# Patient Record
Sex: FEMALE
[De-identification: names, ages, dates, MRNs, and addresses within clinical notes are randomized per-mention and may not be internally consistent; named-entity substitution may affect disease eponyms.]

---

## 2018-04-26 ENCOUNTER — HOSPITAL (OUTPATIENT)
Dept: OTHER | Age: 53
End: 2018-04-26
Attending: OBSTETRICS & GYNECOLOGY

## 2018-05-02 ENCOUNTER — HOSPITAL (OUTPATIENT)
Dept: OTHER | Age: 53
End: 2018-05-02
Attending: INTERNAL MEDICINE

## 2018-05-03 ENCOUNTER — DIAGNOSTIC TRANS (OUTPATIENT)
Dept: OTHER | Age: 53
End: 2018-05-03

## 2018-11-06 ENCOUNTER — HOSPITAL (OUTPATIENT)
Dept: OTHER | Age: 53
End: 2018-11-06
Attending: INTERNAL MEDICINE

## 2018-11-06 LAB
ALBUMIN SERPL-MCNC: 3.9 GM/DL (ref 3.6–5.1)
ALBUMIN/GLOB SERPL: 1 {RATIO} (ref 1–2.4)
ALP SERPL-CCNC: 124 UNIT/L (ref 45–117)
ALT SERPL-CCNC: 17 UNIT/L
ANALYZER ANC (IANC): ABNORMAL
ANION GAP SERPL CALC-SCNC: 12 MMOL/L (ref 10–20)
AST SERPL-CCNC: 19 UNIT/L
BILIRUB SERPL-MCNC: 0.7 MG/DL (ref 0.2–1)
BUN SERPL-MCNC: 25 MG/DL (ref 6–20)
BUN/CREAT SERPL: 41 (ref 7–25)
CALCIUM SERPL-MCNC: 10.3 MG/DL (ref 8.4–10.2)
CHLORIDE: 93 MMOL/L (ref 98–107)
CO2 SERPL-SCNC: 30 MMOL/L (ref 21–32)
CREAT SERPL-MCNC: 0.61 MG/DL (ref 0.51–0.95)
ERYTHROCYTE [DISTWIDTH] IN BLOOD: 12.6 % (ref 11–15)
GLOBULIN SER-MCNC: 3.8 GM/DL (ref 2–4)
GLUCOSE SERPL-MCNC: 448 MG/DL (ref 65–99)
HEMATOCRIT: 46.5 % (ref 36–46.5)
HGB BLD-MCNC: 15.7 GM/DL (ref 12–15.5)
MCH RBC QN AUTO: 28.2 PG (ref 26–34)
MCHC RBC AUTO-ENTMCNC: 33.8 GM/DL (ref 32–36.5)
MCV RBC AUTO: 83.6 FL (ref 78–100)
NRBC (NRBCRE): 0 /100 WBC
PLATELET # BLD: 220 THOUSAND/MCL (ref 140–450)
POTASSIUM SERPL-SCNC: 4 MMOL/L (ref 3.4–5.1)
PROCALCITONIN SERPL IA-MCNC: <0.05 NG/ML
PROT SERPL-MCNC: 7.7 GM/DL (ref 6.4–8.2)
RBC # BLD: 5.56 MILLION/MCL (ref 4–5.2)
SODIUM SERPL-SCNC: 131 MMOL/L (ref 135–145)
WBC # BLD: 17.5 THOUSAND/MCL (ref 4.2–11)

## 2019-06-13 ENCOUNTER — HOSPITAL ENCOUNTER (OUTPATIENT)
Age: 54
Discharge: HOME | End: 2019-06-13
Attending: FAMILY MEDICINE
Payer: COMMERCIAL

## 2019-06-13 VITALS
SYSTOLIC BLOOD PRESSURE: 142 MMHG | HEIGHT: 65.5 IN | BODY MASS INDEX: 28.81 KG/M2 | WEIGHT: 175 LBS | TEMPERATURE: 100 F | HEART RATE: 96 BPM | DIASTOLIC BLOOD PRESSURE: 73 MMHG | OXYGEN SATURATION: 96 % | RESPIRATION RATE: 16 BRPM

## 2019-06-13 DIAGNOSIS — J02.9 PHARYNGITIS, UNSPECIFIED ETIOLOGY: Primary | ICD-10-CM

## 2019-06-13 PROCEDURE — 87081 CULTURE SCREEN ONLY: CPT | Performed by: FAMILY MEDICINE

## 2019-06-13 PROCEDURE — 99204 OFFICE O/P NEW MOD 45 MIN: CPT

## 2019-06-13 PROCEDURE — 87430 STREP A AG IA: CPT | Performed by: FAMILY MEDICINE

## 2019-06-13 PROCEDURE — 99214 OFFICE O/P EST MOD 30 MIN: CPT

## 2019-06-13 RX ORDER — METOPROLOL SUCCINATE 25 MG/1
50 TABLET, EXTENDED RELEASE ORAL DAILY
COMMUNITY
End: 2020-06-12

## 2019-06-13 RX ORDER — LOSARTAN POTASSIUM 25 MG/1
TABLET ORAL DAILY
COMMUNITY
End: 2019-10-03

## 2019-06-13 RX ORDER — ATORVASTATIN CALCIUM 40 MG/1
40 TABLET, FILM COATED ORAL NIGHTLY
COMMUNITY
End: 2020-07-28

## 2019-06-13 RX ORDER — AZITHROMYCIN 250 MG/1
TABLET, FILM COATED ORAL
Qty: 1 PACKAGE | Refills: 0 | Status: SHIPPED | OUTPATIENT
Start: 2019-06-13 | End: 2019-06-18

## 2019-06-13 RX ORDER — ESCITALOPRAM OXALATE 10 MG/1
10 TABLET ORAL DAILY
COMMUNITY
End: 2019-10-03

## 2019-06-13 RX ORDER — AMLODIPINE BESYLATE 5 MG/1
5 TABLET ORAL DAILY
COMMUNITY
End: 2020-02-05

## 2019-06-14 NOTE — ED INITIAL ASSESSMENT (HPI)
Sore throat that started yesterday, no fevers but now her fever is 100.4 F or cough, \"There is pus in the back of my throat. + body aches and chills. No cough.

## 2019-06-14 NOTE — ED PROVIDER NOTES
Patient Seen in: [id number] [facility]    History   Patient presents with:  Sore Throat    Stated Complaint: strep ? HPI    47-year-old female presents with sore throat and fever. States she has a sudden onset of sore throat yesterday . she are equal, round, and reactive to light. Conjunctivae and EOM are normal.   Neck: Normal range of motion. Neck supple. Cardiovascular: Normal rate, regular rhythm and normal heart sounds.    Pulmonary/Chest: Effort normal and breath sounds normal.   Good

## 2019-06-16 NOTE — ED NOTES
Informed of strep culture result. Sts symptoms are improving. Advised to f/u with PMD if symptoms return. Verb understanding.

## 2019-10-03 ENCOUNTER — OFFICE VISIT (OUTPATIENT)
Dept: FAMILY MEDICINE CLINIC | Facility: CLINIC | Age: 54
End: 2019-10-03
Payer: COMMERCIAL

## 2019-10-03 VITALS
HEIGHT: 65.5 IN | BODY MASS INDEX: 29.46 KG/M2 | HEART RATE: 76 BPM | TEMPERATURE: 99 F | OXYGEN SATURATION: 95 % | RESPIRATION RATE: 16 BRPM | DIASTOLIC BLOOD PRESSURE: 80 MMHG | WEIGHT: 179 LBS | SYSTOLIC BLOOD PRESSURE: 128 MMHG

## 2019-10-03 DIAGNOSIS — E11.9 TYPE 2 DIABETES MELLITUS WITHOUT COMPLICATION, WITHOUT LONG-TERM CURRENT USE OF INSULIN (HCC): Primary | ICD-10-CM

## 2019-10-03 PROCEDURE — 99204 OFFICE O/P NEW MOD 45 MIN: CPT | Performed by: FAMILY MEDICINE

## 2019-10-03 RX ORDER — LOSARTAN POTASSIUM 50 MG/1
TABLET ORAL
COMMUNITY
Start: 2019-09-28 | End: 2019-12-12

## 2019-10-03 RX ORDER — CHLORTHALIDONE 25 MG/1
TABLET ORAL
Refills: 1 | COMMUNITY
Start: 2019-08-21 | End: 2019-10-03

## 2019-10-03 RX ORDER — ESCITALOPRAM OXALATE 20 MG/1
20 TABLET ORAL
Refills: 3 | COMMUNITY
Start: 2019-07-27 | End: 2020-08-11

## 2019-10-03 RX ORDER — ZOLPIDEM TARTRATE 10 MG/1
TABLET ORAL
COMMUNITY
Start: 2019-09-30 | End: 2019-12-12

## 2019-10-03 NOTE — PROGRESS NOTES
Alyssa Jean is a 62year old female. Patient presents with:  Establish Care      HPI:   Patient recently moved to the area. She has a history of diabetes. She was diagnosed about a year ago. She has subsequently lost 40 to 50 pounds.   Her initial A1c wa denies chest pain   GI: denies nausea, vomiting, diarrhea or abdominal pain   NEURO: denies headaches    EXAM:   /80   Pulse 76   Temp 98.7 °F (37.1 °C) (Temporal)   Resp 16   Ht 65.5\"   Wt 179 lb (81.2 kg)   SpO2 95%   BMI 29.33 kg/m²   GENERAL: we

## 2019-10-07 DIAGNOSIS — E11.9 TYPE 2 DIABETES MELLITUS WITHOUT COMPLICATION, WITHOUT LONG-TERM CURRENT USE OF INSULIN (HCC): Primary | ICD-10-CM

## 2019-11-07 ENCOUNTER — TELEPHONE (OUTPATIENT)
Dept: FAMILY MEDICINE CLINIC | Facility: CLINIC | Age: 54
End: 2019-11-07

## 2019-11-07 DIAGNOSIS — Z80.3 FAMILY HISTORY OF BREAST CANCER IN MOTHER: ICD-10-CM

## 2019-11-07 DIAGNOSIS — Z12.39 SCREENING FOR BREAST CANCER: Primary | ICD-10-CM

## 2019-11-07 NOTE — TELEPHONE ENCOUNTER
States she has had to be called back for additional views in the past. Told of dense breasts. Also Mom had breast cancer. Has been recommended to have the 2D & 3D mammogram done. Mammogram ordered, central scheduling # provided.   Discussed need for gynecol

## 2019-11-15 ENCOUNTER — HOSPITAL ENCOUNTER (OUTPATIENT)
Dept: MAMMOGRAPHY | Age: 54
Discharge: HOME | End: 2019-11-15
Attending: FAMILY MEDICINE
Payer: COMMERCIAL

## 2019-11-15 DIAGNOSIS — Z12.39 SCREENING FOR BREAST CANCER: ICD-10-CM

## 2019-11-15 DIAGNOSIS — Z80.3 FAMILY HISTORY OF BREAST CANCER IN MOTHER: ICD-10-CM

## 2019-11-15 PROCEDURE — 77063 BREAST TOMOSYNTHESIS BI: CPT | Performed by: FAMILY MEDICINE

## 2019-11-15 PROCEDURE — 77067 SCR MAMMO BI INCL CAD: CPT | Performed by: FAMILY MEDICINE

## 2019-11-18 DIAGNOSIS — E11.9 TYPE 2 DIABETES MELLITUS WITHOUT COMPLICATION, WITHOUT LONG-TERM CURRENT USE OF INSULIN (HCC): Primary | ICD-10-CM

## 2019-11-20 ENCOUNTER — OFFICE VISIT (OUTPATIENT)
Dept: FAMILY MEDICINE CLINIC | Facility: CLINIC | Age: 54
End: 2019-11-20
Payer: COMMERCIAL

## 2019-11-20 VITALS
OXYGEN SATURATION: 99 % | WEIGHT: 181.25 LBS | BODY MASS INDEX: 30.57 KG/M2 | RESPIRATION RATE: 18 BRPM | HEIGHT: 64.74 IN | TEMPERATURE: 98 F

## 2019-11-20 DIAGNOSIS — Z01.419 ROUTINE GYNECOLOGICAL EXAMINATION: Primary | ICD-10-CM

## 2019-11-20 DIAGNOSIS — N95.2 POSTMENOPAUSAL ATROPHIC VAGINITIS: ICD-10-CM

## 2019-11-20 DIAGNOSIS — G47.33 OSA (OBSTRUCTIVE SLEEP APNEA): ICD-10-CM

## 2019-11-20 DIAGNOSIS — E11.9 TYPE 2 DIABETES MELLITUS WITHOUT COMPLICATION, WITHOUT LONG-TERM CURRENT USE OF INSULIN (HCC): ICD-10-CM

## 2019-11-20 DIAGNOSIS — E78.2 MIXED HYPERLIPIDEMIA: ICD-10-CM

## 2019-11-20 DIAGNOSIS — R42 DIZZINESS ON STANDING: ICD-10-CM

## 2019-11-20 DIAGNOSIS — I10 ESSENTIAL HYPERTENSION: ICD-10-CM

## 2019-11-20 PROBLEM — G47.09 OTHER INSOMNIA: Status: ACTIVE | Noted: 2019-11-20

## 2019-11-20 PROBLEM — F41.9 ANXIETY AND DEPRESSION: Status: ACTIVE | Noted: 2019-11-20

## 2019-11-20 PROBLEM — F32.A ANXIETY AND DEPRESSION: Status: ACTIVE | Noted: 2019-11-20

## 2019-11-20 PROCEDURE — 87624 HPV HI-RISK TYP POOLED RSLT: CPT | Performed by: NURSE PRACTITIONER

## 2019-11-20 PROCEDURE — 88175 CYTOPATH C/V AUTO FLUID REDO: CPT | Performed by: NURSE PRACTITIONER

## 2019-11-20 PROCEDURE — 99396 PREV VISIT EST AGE 40-64: CPT | Performed by: NURSE PRACTITIONER

## 2019-11-20 RX ORDER — AMLODIPINE BESYLATE 5 MG/1
TABLET ORAL
Qty: 90 TABLET | Refills: 0 | OUTPATIENT
Start: 2019-11-20

## 2019-11-20 RX ORDER — ESTRADIOL 0.1 MG/G
1 CREAM VAGINAL
Qty: 42.5 G | Refills: 0 | Status: SHIPPED | OUTPATIENT
Start: 2019-11-21 | End: 2020-11-30

## 2019-11-20 NOTE — PROGRESS NOTES
HPI:   Patient is here for physical and pap smear. She has been experiencing painful intercourse for several months. Her vagina feels very dry. She had an endometrial ablation about 15 years ago.  She did bleed lightly after the ablation but stopped bleedin Semaglutide (OZEMPIC) 0.25 or 0.5 MG/DOSE Subcutaneous Solution Pen-injector Inject into the skin once a week. • chlorthalidone 25 MG Oral Tab Take 25 mg by mouth daily.         Past Medical History:   Diagnosis Date   • Diabetes [facility])    • Essential hy Negative for joint swelling. Skin: Negative for rash. Neurological: Positive for dizziness (upon standing). Negative for weakness, numbness and headaches. Hematological: Does not bruise/bleed easily.    Psychiatric/Behavioral: Positive for sleep distu orders for this visit:    Routine gynecological examination  -     Cancel: THINPREP PAP SMEAR B; Future  -     Cancel: HPV HIGH RISK , THIN PREP COLLECTION; Future  -     HPV HIGH RISK , THIN PREP COLLECTION;  Future  -     THINPREP PAP SMEAR B; Future    P

## 2019-11-20 NOTE — TELEPHONE ENCOUNTER
LOV today, physical P Amauri LOVE    LAST LAB    LAST RX   AMLODIPINE BESYLATE 5 MG TABS 11/18/2019 11/18/2019  90 Bottle  [address] [id number]      Never given by Dr Stover Given    Next OV No future appointments.       PROTOCOL    Hypertens

## 2019-12-13 ENCOUNTER — TELEPHONE (OUTPATIENT)
Dept: FAMILY MEDICINE CLINIC | Facility: CLINIC | Age: 54
End: 2019-12-13

## 2019-12-13 RX ORDER — LOSARTAN POTASSIUM 50 MG/1
TABLET ORAL
Qty: 90 TABLET | Refills: 1 | Status: SHIPPED | OUTPATIENT
Start: 2019-12-13 | End: 2020-06-12

## 2019-12-13 RX ORDER — SEMAGLUTIDE 1.34 MG/ML
INJECTION, SOLUTION SUBCUTANEOUS
Qty: 4.5 PEN | Refills: 1 | Status: SHIPPED | OUTPATIENT
Start: 2019-12-13 | End: 2020-10-14

## 2019-12-13 RX ORDER — ZOLPIDEM TARTRATE 10 MG/1
TABLET ORAL
Qty: 30 TABLET | Refills: 1 | Status: SHIPPED | OUTPATIENT
Start: 2019-12-13 | End: 2020-03-13

## 2019-12-13 NOTE — TELEPHONE ENCOUNTER
PHARMACY CALLED, LOSARTAN 50MG IS ON BACK ORDER.      THEY CAN CHANGE TO LOSARTAN 100MG TO CUT IN HALF, OR CAN CHANGE TO LOSARTAN 25MG TO TAKE 2 TABS TO EQUAL 50MG      PLEASE ADVISE

## 2019-12-14 RX ORDER — LOSARTAN POTASSIUM 100 MG/1
50 TABLET ORAL DAILY
Qty: 45 TABLET | Refills: 0 | Status: SHIPPED | OUTPATIENT
Start: 2019-12-14 | End: 2020-03-13

## 2019-12-31 RX ORDER — METOPROLOL SUCCINATE 50 MG/1
TABLET, EXTENDED RELEASE ORAL
Qty: 90 TABLET | Refills: 0 | Status: SHIPPED | OUTPATIENT
Start: 2019-12-31 | End: 2020-03-23

## 2020-02-05 RX ORDER — AMLODIPINE BESYLATE 5 MG/1
TABLET ORAL
Qty: 90 TABLET | Refills: 0 | Status: SHIPPED | OUTPATIENT
Start: 2020-02-05 | End: 2020-05-11

## 2020-02-05 NOTE — TELEPHONE ENCOUNTER
Last office visit: 10/03/19  Last refill: Externally Reported  Labs Due: 4/2020  No future appointments.   Protocol: Passed    Name from pharmacy: AMLODIPINE BESYLATE 5 MG TAB          Will file in chart as: AMLODIPINE BESYLATE 5 MG Oral Tab         Sig: TA

## 2020-03-13 RX ORDER — ZOLPIDEM TARTRATE 10 MG/1
TABLET ORAL
Qty: 30 TABLET | Refills: 1 | Status: SHIPPED | OUTPATIENT
Start: 2020-03-13 | End: 2020-06-15

## 2020-03-13 RX ORDER — LOSARTAN POTASSIUM 100 MG/1
50 TABLET ORAL DAILY
Qty: 45 TABLET | Refills: 0 | Status: SHIPPED | OUTPATIENT
Start: 2020-03-13 | End: 2020-06-12 | Stop reason: ALTCHOICE

## 2020-03-13 NOTE — TELEPHONE ENCOUNTER
Last refill on zolpidem #30 x 1 on 12/13/19  Last refill on losartan #45 on 12/14/19  Last office visit on 11/20/19  No future appointments.   Last cmp on 10/4/19

## 2020-03-23 RX ORDER — METOPROLOL SUCCINATE 50 MG/1
TABLET, EXTENDED RELEASE ORAL
Qty: 90 TABLET | Refills: 0 | Status: SHIPPED | OUTPATIENT
Start: 2020-03-23 | End: 2020-06-15

## 2020-05-11 RX ORDER — CHLORTHALIDONE 25 MG/1
TABLET ORAL
Qty: 90 TABLET | Refills: 0 | Status: SHIPPED | OUTPATIENT
Start: 2020-05-11 | End: 2020-07-28

## 2020-05-11 RX ORDER — AMLODIPINE BESYLATE 5 MG/1
TABLET ORAL
Qty: 90 TABLET | Refills: 0 | Status: SHIPPED | OUTPATIENT
Start: 2020-05-11 | End: 2020-07-28

## 2020-05-11 NOTE — TELEPHONE ENCOUNTER
Hypertension Medications Protocol Passed5/10 12:16 AM   CMP or BMP in past 12 months    Last serum creatinine< 2.0    Appointment in past 6 or next 3 months     BP Readings from Last 3 Encounters:  10/03/19 : 128/80  06/13/19 : 142/73    No future appointm

## 2020-05-11 NOTE — TELEPHONE ENCOUNTER
Please schedule a follow-up appointment in 1 month. We will recheck blood pressure and blood work at that time.

## 2020-06-10 RX ORDER — ZOLPIDEM TARTRATE 10 MG/1
TABLET ORAL
Qty: 30 TABLET | Refills: 1 | OUTPATIENT
Start: 2020-06-10

## 2020-06-12 ENCOUNTER — OFFICE VISIT (OUTPATIENT)
Dept: FAMILY MEDICINE CLINIC | Facility: CLINIC | Age: 55
End: 2020-06-12
Payer: COMMERCIAL

## 2020-06-12 ENCOUNTER — APPOINTMENT (OUTPATIENT)
Dept: LAB | Age: 55
End: 2020-06-12
Attending: FAMILY MEDICINE
Payer: COMMERCIAL

## 2020-06-12 VITALS
WEIGHT: 181.38 LBS | DIASTOLIC BLOOD PRESSURE: 80 MMHG | BODY MASS INDEX: 30.59 KG/M2 | SYSTOLIC BLOOD PRESSURE: 124 MMHG | OXYGEN SATURATION: 97 % | HEART RATE: 78 BPM | HEIGHT: 64.75 IN | TEMPERATURE: 97 F

## 2020-06-12 DIAGNOSIS — G47.33 OSA (OBSTRUCTIVE SLEEP APNEA): ICD-10-CM

## 2020-06-12 DIAGNOSIS — E78.2 MIXED HYPERLIPIDEMIA: ICD-10-CM

## 2020-06-12 DIAGNOSIS — E11.9 TYPE 2 DIABETES MELLITUS WITHOUT COMPLICATION, WITHOUT LONG-TERM CURRENT USE OF INSULIN (HCC): Primary | ICD-10-CM

## 2020-06-12 DIAGNOSIS — I10 ESSENTIAL HYPERTENSION: ICD-10-CM

## 2020-06-12 PROCEDURE — 99214 OFFICE O/P EST MOD 30 MIN: CPT | Performed by: FAMILY MEDICINE

## 2020-06-12 PROCEDURE — 90471 IMMUNIZATION ADMIN: CPT | Performed by: FAMILY MEDICINE

## 2020-06-12 PROCEDURE — 90670 PCV13 VACCINE IM: CPT | Performed by: FAMILY MEDICINE

## 2020-06-12 RX ORDER — LOSARTAN POTASSIUM 50 MG/1
TABLET ORAL DAILY
COMMUNITY
End: 2020-10-05

## 2020-06-12 NOTE — PROGRESS NOTES
Tejinder Espinoza is a 62year old female. Patient presents with:  Diabetes: fup on meds, due for DM labs, eye exam, FOOT exam....room 1  HTN: fup on meds, BP check      HPI:   Patient returns to recheck her blood pressure.   She is also due for diabeti , Disp: , Rfl:   [DISCONTINUED] metFORMIN HCl 500 MG Oral Tab, Take 500 mg by mouth 2 (two) times daily with meals. , Disp: , Rfl:   [DISCONTINUED] chlorthalidone 25 MG Oral Tab, Take 25 mg by mouth daily. , Disp: , Rfl:     No current facility-administered reflexes present            Monofilament testing indicates normal sensation            ASSESSMENT AND PLAN:     Type 2 diabetes mellitus without complication, without long-term current use of insulin (hcc)  (primary encounter diagnosis)  Essential hyperten

## 2020-06-13 NOTE — TELEPHONE ENCOUNTER
LOV:  6/12/2020     LAB:    6/12/2020     LRX:    METOPROLOL SUCCINATE ER 50 MG Oral Tablet 24 Hr 90 tablet 0 3/23/2020  Zolpidem Tartrate 10 MG Oral Tab 30 tablet 1 refill 3/13/2020    NOV:   No future appointments.     PROTOCOL:

## 2020-06-15 DIAGNOSIS — E11.9 TYPE 2 DIABETES MELLITUS WITHOUT COMPLICATION, WITHOUT LONG-TERM CURRENT USE OF INSULIN (HCC): Primary | ICD-10-CM

## 2020-06-15 DIAGNOSIS — I10 ESSENTIAL HYPERTENSION: ICD-10-CM

## 2020-06-15 DIAGNOSIS — E78.2 MIXED HYPERLIPIDEMIA: ICD-10-CM

## 2020-06-15 RX ORDER — ZOLPIDEM TARTRATE 10 MG/1
TABLET ORAL
Qty: 30 TABLET | Refills: 1 | Status: SHIPPED | OUTPATIENT
Start: 2020-06-15 | End: 2020-09-23

## 2020-06-15 RX ORDER — METOPROLOL SUCCINATE 50 MG/1
TABLET, EXTENDED RELEASE ORAL
Qty: 90 TABLET | Refills: 0 | Status: SHIPPED | OUTPATIENT
Start: 2020-06-15 | End: 2020-07-28

## 2020-07-28 RX ORDER — ATORVASTATIN CALCIUM 40 MG/1
40 TABLET, FILM COATED ORAL NIGHTLY
Qty: 90 TABLET | Refills: 0 | Status: SHIPPED | OUTPATIENT
Start: 2020-07-28 | End: 2020-10-21

## 2020-07-28 RX ORDER — METOPROLOL SUCCINATE 50 MG/1
TABLET, EXTENDED RELEASE ORAL
Qty: 90 TABLET | Refills: 1 | Status: SHIPPED | OUTPATIENT
Start: 2020-07-28 | End: 2021-01-28

## 2020-07-28 RX ORDER — CHLORTHALIDONE 25 MG/1
TABLET ORAL
Qty: 90 TABLET | Refills: 1 | Status: SHIPPED | OUTPATIENT
Start: 2020-07-28 | End: 2021-01-28

## 2020-07-28 RX ORDER — AMLODIPINE BESYLATE 5 MG/1
TABLET ORAL
Qty: 90 TABLET | Refills: 1 | Status: SHIPPED | OUTPATIENT
Start: 2020-07-28 | End: 2021-01-28

## 2020-07-28 NOTE — TELEPHONE ENCOUNTER
LOV: 06/12/20    LAST LAB: 06/12/20    LAST RX: 06/15/20    Next OV: No future appointments.     PROTOCOL    Hypertension Medications Protocol Passed7/28 10:49 AM   CMP or BMP in past 12 months    Last serum creatinine< 2.0    Appointment in past 6 or next

## 2020-07-28 NOTE — TELEPHONE ENCOUNTER
LOV: 06/12/20    LAST LAB: 06/12/20    LAST RX: ?    Next OV: No future appointments.     PROTOCOL    Cholesterol Medication Protocol Passed7/28 2:51 PM   ALT < 80    ALT resulted within past year    Lipid panel within past 12 months    Appointment within p

## 2020-08-11 RX ORDER — ESCITALOPRAM OXALATE 20 MG/1
20 TABLET ORAL EVERY MORNING
Qty: 90 TABLET | Refills: 0 | Status: SHIPPED | OUTPATIENT
Start: 2020-08-11 | End: 2020-11-09

## 2020-09-23 RX ORDER — ZOLPIDEM TARTRATE 10 MG/1
TABLET ORAL
Qty: 30 TABLET | Refills: 1 | Status: SHIPPED | OUTPATIENT
Start: 2020-09-23 | End: 2020-11-30

## 2020-09-23 NOTE — TELEPHONE ENCOUNTER
Last OV: 6/12/20  Last refill: 6/15/20 #30 Tablets w/ 1 refill  Requested Prescriptions     Pending Prescriptions Disp Refills   • ZOLPIDEM TARTRATE 10 MG Oral Tab [Pharmacy Med Name: ZOLPIDEM TARTRATE 10 MG TABLET] 30 tablet 1     Sig: TAKE 1 TABLET BY MO

## 2020-10-05 RX ORDER — LOSARTAN POTASSIUM 50 MG/1
50 TABLET ORAL DAILY
Qty: 90 TABLET | Refills: 0 | Status: SHIPPED | OUTPATIENT
Start: 2020-10-05 | End: 2021-01-04

## 2020-10-05 NOTE — TELEPHONE ENCOUNTER
LOV: 06/12/20    LAST LAB: 06/12/20    LAST RX: Metformin 03/30/20, Losartan 03/13/20 - Confirmed with pt that she is still taking losartan 50mg daily. Next OV: No future appointments.     PROTOCOL    Diabetic Medication Protocol Tnjryx56/05/2020 11:25

## 2020-10-14 RX ORDER — SEMAGLUTIDE 1.34 MG/ML
0.5 INJECTION, SOLUTION SUBCUTANEOUS WEEKLY
Qty: 4.5 PEN | Refills: 0 | Status: SHIPPED | OUTPATIENT
Start: 2020-10-14 | End: 2021-01-08

## 2020-10-14 NOTE — TELEPHONE ENCOUNTER
LOV: 6/12/20    LAST LAB: 6/12/20    LAST RX: 12/13/19    Next OV: No future appointments.     PROTOCOL    Diabetic Medication Protocol Xsyflb30/14/2020 01:45 PM   HgBA1C procedure resulted in past 6 months Protocol Details    Last HgBA1C < 7.5     Microalb

## 2020-10-16 ENCOUNTER — TELEPHONE (OUTPATIENT)
Dept: FAMILY MEDICINE CLINIC | Facility: CLINIC | Age: 55
End: 2020-10-16

## 2020-10-16 NOTE — TELEPHONE ENCOUNTER
Destinee Umana [facility] is saying that her Semaglutide,0.25 or 0.5MG/DOS, (OZEMPIC, 0.25 OR 0.5 MG/DOSE,) 2 MG/1.5ML Subcutaneous Solution Pen-injector is being rejected because it is only for 1 month if we could please change this to a 3 month script.   Destinee Umana

## 2020-10-16 NOTE — TELEPHONE ENCOUNTER
[facility] to inquire about Ozem[facility] (see pt's msg below). [facility] states that all has been taken care of, they do not need any additional info from provider and refill will be processed. Left detailed msg for pt. Advised to call pharmacy.

## 2020-10-21 ENCOUNTER — TELEPHONE (OUTPATIENT)
Dept: FAMILY MEDICINE CLINIC | Facility: CLINIC | Age: 55
End: 2020-10-21

## 2020-10-21 DIAGNOSIS — Z12.31 BREAST CANCER SCREENING BY MAMMOGRAM: Primary | ICD-10-CM

## 2020-10-21 RX ORDER — ATORVASTATIN CALCIUM 40 MG/1
TABLET, FILM COATED ORAL
Qty: 90 TABLET | Refills: 0 | Status: SHIPPED | OUTPATIENT
Start: 2020-10-21 | End: 2021-01-13

## 2020-10-21 NOTE — TELEPHONE ENCOUNTER
Last office visit 6/12/2020      Last last refill 7/28/20 Atorvastatin 40 mg #90 x 0 refills       Last lab 6/12/2020                     Cholesterol Medication Protocol Obljca30/21/2020 03:33 AM   ALT < 80 Protocol Details    ALT resulted within past

## 2020-10-21 NOTE — TELEPHONE ENCOUNTER
Last mammogram screening was 11/7/19, does she need to wait full year for insurance to cover it? Order has been placed.

## 2020-11-09 RX ORDER — ESCITALOPRAM OXALATE 20 MG/1
TABLET ORAL
Qty: 90 TABLET | Refills: 0 | Status: SHIPPED | OUTPATIENT
Start: 2020-11-09 | End: 2021-02-08

## 2020-11-09 NOTE — TELEPHONE ENCOUNTER
LOV:6-  LAST LAB : 6-    LAST RX:     Medication Quantity Refills Start End   escitalopram 20 MG Oral Tab 90 tablet 0 8/11/2020    Sig:   Take 1 tablet (20 mg total) by mouth every morning.            Next OV:  Future Appointments   Date Time

## 2020-11-16 ENCOUNTER — HOSPITAL ENCOUNTER (OUTPATIENT)
Dept: MAMMOGRAPHY | Age: 55
Discharge: HOME | End: 2020-11-16
Attending: NURSE PRACTITIONER
Payer: COMMERCIAL

## 2020-11-16 DIAGNOSIS — Z12.31 BREAST CANCER SCREENING BY MAMMOGRAM: ICD-10-CM

## 2020-11-16 PROCEDURE — 77067 SCR MAMMO BI INCL CAD: CPT | Performed by: NURSE PRACTITIONER

## 2020-11-19 ENCOUNTER — HOSPITAL ENCOUNTER (OUTPATIENT)
Dept: ULTRASOUND IMAGING | Age: 55
Discharge: HOME | End: 2020-11-19
Attending: NURSE PRACTITIONER
Payer: COMMERCIAL

## 2020-11-19 ENCOUNTER — HOSPITAL ENCOUNTER (OUTPATIENT)
Dept: MAMMOGRAPHY | Age: 55
Discharge: HOME | End: 2020-11-19
Attending: NURSE PRACTITIONER
Payer: COMMERCIAL

## 2020-11-19 DIAGNOSIS — R92.2 INCONCLUSIVE MAMMOGRAM: ICD-10-CM

## 2020-11-19 PROCEDURE — 77065 DX MAMMO INCL CAD UNI: CPT | Performed by: NURSE PRACTITIONER

## 2020-11-19 PROCEDURE — 77061 BREAST TOMOSYNTHESIS UNI: CPT | Performed by: NURSE PRACTITIONER

## 2020-11-19 PROCEDURE — 76642 ULTRASOUND BREAST LIMITED: CPT | Performed by: NURSE PRACTITIONER

## 2020-11-30 ENCOUNTER — LAB ENCOUNTER (OUTPATIENT)
Dept: LAB | Age: 55
End: 2020-11-30
Attending: FAMILY MEDICINE
Payer: COMMERCIAL

## 2020-11-30 ENCOUNTER — OFFICE VISIT (OUTPATIENT)
Dept: FAMILY MEDICINE CLINIC | Facility: CLINIC | Age: 55
End: 2020-11-30
Payer: COMMERCIAL

## 2020-11-30 VITALS
BODY MASS INDEX: 30.4 KG/M2 | TEMPERATURE: 98 F | HEIGHT: 64.75 IN | DIASTOLIC BLOOD PRESSURE: 78 MMHG | HEART RATE: 78 BPM | WEIGHT: 180.25 LBS | RESPIRATION RATE: 18 BRPM | SYSTOLIC BLOOD PRESSURE: 120 MMHG

## 2020-11-30 DIAGNOSIS — E78.2 MIXED HYPERLIPIDEMIA: ICD-10-CM

## 2020-11-30 DIAGNOSIS — I10 ESSENTIAL HYPERTENSION: ICD-10-CM

## 2020-11-30 DIAGNOSIS — R00.2 PALPITATIONS: ICD-10-CM

## 2020-11-30 DIAGNOSIS — Z00.00 PREVENTATIVE HEALTH CARE: Primary | ICD-10-CM

## 2020-11-30 DIAGNOSIS — N95.2 POSTMENOPAUSAL ATROPHIC VAGINITIS: ICD-10-CM

## 2020-11-30 DIAGNOSIS — N95.0 POSTMENOPAUSAL BLEEDING: ICD-10-CM

## 2020-11-30 DIAGNOSIS — G47.33 OSA (OBSTRUCTIVE SLEEP APNEA): ICD-10-CM

## 2020-11-30 DIAGNOSIS — E11.9 TYPE 2 DIABETES MELLITUS WITHOUT COMPLICATION, WITHOUT LONG-TERM CURRENT USE OF INSULIN (HCC): ICD-10-CM

## 2020-11-30 DIAGNOSIS — F32.A ANXIETY AND DEPRESSION: ICD-10-CM

## 2020-11-30 DIAGNOSIS — F41.9 ANXIETY AND DEPRESSION: ICD-10-CM

## 2020-11-30 PROCEDURE — 3008F BODY MASS INDEX DOCD: CPT | Performed by: NURSE PRACTITIONER

## 2020-11-30 PROCEDURE — 3074F SYST BP LT 130 MM HG: CPT | Performed by: NURSE PRACTITIONER

## 2020-11-30 PROCEDURE — 83036 HEMOGLOBIN GLYCOSYLATED A1C: CPT | Performed by: FAMILY MEDICINE

## 2020-11-30 PROCEDURE — 80061 LIPID PANEL: CPT | Performed by: FAMILY MEDICINE

## 2020-11-30 PROCEDURE — 99396 PREV VISIT EST AGE 40-64: CPT | Performed by: NURSE PRACTITIONER

## 2020-11-30 PROCEDURE — 80053 COMPREHEN METABOLIC PANEL: CPT | Performed by: FAMILY MEDICINE

## 2020-11-30 PROCEDURE — 36415 COLL VENOUS BLD VENIPUNCTURE: CPT | Performed by: FAMILY MEDICINE

## 2020-11-30 PROCEDURE — 93000 ELECTROCARDIOGRAM COMPLETE: CPT | Performed by: NURSE PRACTITIONER

## 2020-11-30 PROCEDURE — 99072 ADDL SUPL MATRL&STAF TM PHE: CPT | Performed by: NURSE PRACTITIONER

## 2020-11-30 PROCEDURE — 3078F DIAST BP <80 MM HG: CPT | Performed by: NURSE PRACTITIONER

## 2020-11-30 RX ORDER — CONJUGATED ESTROGENS 0.62 MG/G
0.5 CREAM VAGINAL
Qty: 30 G | Refills: 0 | Status: SHIPPED | OUTPATIENT
Start: 2020-11-30 | End: 2021-12-01

## 2020-11-30 RX ORDER — ZOLPIDEM TARTRATE 10 MG/1
10 TABLET ORAL NIGHTLY PRN
Qty: 30 TABLET | Refills: 0 | Status: SHIPPED | OUTPATIENT
Start: 2020-11-30 | End: 2021-01-21

## 2020-11-30 NOTE — TELEPHONE ENCOUNTER
Last refill: 09/23/20  Qty: 30   W/ 1 refills  Last ov: 11/30/20    Requested Prescriptions     Pending Prescriptions Disp Refills   • Zolpidem Tartrate 10 MG Oral Tab 30 tablet 0     Sig: Take 1 tablet (10 mg total) by mouth nightly as needed.  AT BEDTIME

## 2020-11-30 NOTE — PROGRESS NOTES
HPI: HPI   Patient is here for annual physical. Would like EKG done as well. Has had HTN for many years and strong family history of cardiac disease. Previous EKGs have been abnormal. She has had a stress test and echo previously.  She does have occasiona 1   • METOPROLOL SUCCINATE ER 50 MG Oral Tablet 24 Hr TAKE 1 TABLET BY MOUTH EVERY DAY 90 tablet 1   • AMLODIPINE BESYLATE 5 MG Oral Tab TAKE 1 TABLET BY MOUTH EVERY DAY 90 tablet 1   • CHLORTHALIDONE 25 MG Oral Tab TAKE 1 TABLET BY MOUTH EVERY DAY IN THE for joint swelling. Skin: Negative for rash. Neurological: Negative for dizziness, weakness, numbness and headaches. Episode of vertigo 2 weeks ago   Hematological: Does not bruise/bleed easily.    Psychiatric/Behavioral: Positive for sleep distu Sujey Jeffery     normal EKG, normal sinus rhythm and unchanged from previous tracings    Rate: 76  OR: 162  QRS: 82  QT: 422  QTc: 474    ASSESSMENT AND PLAN:   Diagnoses and all orders for this visit:    Preventative health care    Essential hypertension

## 2020-12-01 DIAGNOSIS — E11.9 TYPE 2 DIABETES MELLITUS WITHOUT COMPLICATION, WITHOUT LONG-TERM CURRENT USE OF INSULIN (HCC): ICD-10-CM

## 2020-12-01 DIAGNOSIS — I10 ESSENTIAL HYPERTENSION: Primary | ICD-10-CM

## 2020-12-01 DIAGNOSIS — E78.2 MIXED HYPERLIPIDEMIA: ICD-10-CM

## 2020-12-05 ENCOUNTER — TELEPHONE (OUTPATIENT)
Dept: FAMILY MEDICINE CLINIC | Facility: CLINIC | Age: 55
End: 2020-12-05

## 2020-12-05 DIAGNOSIS — E11.9 TYPE 2 DIABETES MELLITUS WITHOUT COMPLICATION, WITHOUT LONG-TERM CURRENT USE OF INSULIN (HCC): ICD-10-CM

## 2020-12-05 DIAGNOSIS — Z01.812 ENCOUNTER FOR PREPROCEDURE SCREENING LABORATORY TESTING FOR COVID-19: ICD-10-CM

## 2020-12-05 DIAGNOSIS — R00.2 PALPITATIONS: Primary | ICD-10-CM

## 2020-12-05 DIAGNOSIS — Z20.822 ENCOUNTER FOR PREPROCEDURE SCREENING LABORATORY TESTING FOR COVID-19: ICD-10-CM

## 2020-12-05 DIAGNOSIS — N95.0 POSTMENOPAUSAL BLEEDING: ICD-10-CM

## 2020-12-05 DIAGNOSIS — E78.2 MIXED HYPERLIPIDEMIA: ICD-10-CM

## 2020-12-05 NOTE — TELEPHONE ENCOUNTER
Message  Received: Yesterday  Message Contents   Jorge Lima sent to [address] [place] [address] Staff             Good afternoon,         I spoke with Damir Magallon with [employer] states that this request is not approved. [facility] will be sent out, however

## 2020-12-05 NOTE — TELEPHONE ENCOUNTER
Call [facility] [place] on Monday and make sure ordering correct  Traditional exercise stress test.

## 2020-12-07 NOTE — TELEPHONE ENCOUNTER
Referral # Creation Date Referral Status Status Update    [id number] 12/07/2020 Authorized 12/07/2020: Status History     Pt advised stress test has been authorized.  She will need to call central scheduling to make appt for COVID testing AND stress test. Horace

## 2021-01-04 RX ORDER — LOSARTAN POTASSIUM 50 MG/1
TABLET ORAL
Qty: 90 TABLET | Refills: 0 | Status: SHIPPED | OUTPATIENT
Start: 2021-01-04 | End: 2021-03-31

## 2021-01-04 NOTE — TELEPHONE ENCOUNTER
LOV: 11/30/20    LAST LAB: 11/30/20    LAST RX: 10/5/20    Next OV: No future appointments.     PROTOCOL    Hypertension Medications Protocol [id number]/01/2021 12:13 AM   CMP or BMP in past 12 months Protocol Details    Last serum creatinine< 2.0     Appoint

## 2021-01-07 NOTE — TELEPHONE ENCOUNTER
Last office visit: 11/30/20  Last refill: 12/13/19  Labs Due: 6/1/21  No future appointments.     Name from pharmacy: Rubin Romero 0.25-0.5 MG DOSE PEN          Will file in chart as: OZEMPIC, 0.25 OR 0.5 MG/DOSE, 2 MG/1.5ML Subcutaneous Solution Pen-injector

## 2021-01-08 ENCOUNTER — TELEPHONE (OUTPATIENT)
Dept: FAMILY MEDICINE CLINIC | Facility: CLINIC | Age: 56
End: 2021-01-08

## 2021-01-08 RX ORDER — SEMAGLUTIDE 1.34 MG/ML
0.5 INJECTION, SOLUTION SUBCUTANEOUS WEEKLY
Qty: 4.5 ML | Refills: 3 | Status: SHIPPED | OUTPATIENT
Start: 2021-01-08 | End: 2021-12-16

## 2021-01-12 ENCOUNTER — MED REC SCAN ONLY (OUTPATIENT)
Dept: FAMILY MEDICINE CLINIC | Facility: CLINIC | Age: 56
End: 2021-01-12

## 2021-01-13 RX ORDER — ATORVASTATIN CALCIUM 40 MG/1
TABLET, FILM COATED ORAL
Qty: 90 TABLET | Refills: 0 | Status: SHIPPED | OUTPATIENT
Start: 2021-01-13 | End: 2021-04-19

## 2021-01-13 NOTE — TELEPHONE ENCOUNTER
Last office visit 11/30/2020      Last refill ATORVASTATIN 40 MG   # 90 x 0 refill       Last Lab 11/30/2020       No future appointments.                    Name from pharmacy: ATORVASTATIN 40 MG TABLET         Will file in chart as: ATORVASTATIN 40 MG Ora

## 2021-01-21 RX ORDER — ZOLPIDEM TARTRATE 10 MG/1
TABLET ORAL
Qty: 30 TABLET | Refills: 0 | Status: SHIPPED | OUTPATIENT
Start: 2021-01-21 | End: 2021-03-11

## 2021-01-21 NOTE — TELEPHONE ENCOUNTER
Last filled 11/30/2020  Last seen 11/30/2020 with APN  Next lab due 6/1/2021  No future appointments.

## 2021-01-28 RX ORDER — CHLORTHALIDONE 25 MG/1
TABLET ORAL
Qty: 90 TABLET | Refills: 1 | Status: SHIPPED | OUTPATIENT
Start: 2021-01-28 | End: 2021-07-30

## 2021-01-28 RX ORDER — AMLODIPINE BESYLATE 5 MG/1
TABLET ORAL
Qty: 90 TABLET | Refills: 1 | Status: SHIPPED | OUTPATIENT
Start: 2021-01-28 | End: 2021-07-30

## 2021-01-28 RX ORDER — METOPROLOL SUCCINATE 50 MG/1
TABLET, EXTENDED RELEASE ORAL
Qty: 90 TABLET | Refills: 1 | Status: SHIPPED | OUTPATIENT
Start: 2021-01-28 | End: 2021-07-30

## 2021-01-28 NOTE — TELEPHONE ENCOUNTER
Last refill on all three meds #90 x 1 on 7/28/2020  Last office visit pertaining to refill on 11/30/2020 - cpx  No future appointments.   BP Readings from Last 3 Encounters:  11/30/20 : 120/78  06/12/20 : 124/80  10/03/19 : 128/80    Labs current until 6/20

## 2021-02-08 RX ORDER — ESCITALOPRAM OXALATE 20 MG/1
20 TABLET ORAL EVERY MORNING
Qty: 90 TABLET | Refills: 0 | Status: SHIPPED | OUTPATIENT
Start: 2021-02-08 | End: 2021-05-06

## 2021-02-08 NOTE — TELEPHONE ENCOUNTER
Last refill #90 on 11/9/2020  Last office visit pertaining to refill on 11/30/2020 - cpx  No future appointments. Patient was to return on or around 12/30/2020  Reminder letter sent.

## 2021-03-11 RX ORDER — ZOLPIDEM TARTRATE 10 MG/1
TABLET ORAL
Qty: 15 TABLET | Refills: 1 | Status: SHIPPED | OUTPATIENT
Start: 2021-03-11 | End: 2021-05-06

## 2021-03-31 RX ORDER — LOSARTAN POTASSIUM 50 MG/1
TABLET ORAL
Qty: 90 TABLET | Refills: 0 | Status: SHIPPED | OUTPATIENT
Start: 2021-03-31 | End: 2021-06-28

## 2021-03-31 NOTE — TELEPHONE ENCOUNTER
LOV : 11-    LAST LAB:11-    LAST RX:  Medication Quantity Refills Start End   LOSARTAN POTASSIUM 50 MG Oral Tab 90 tablet 0 1/4/2021    Sig:   TAKE 1 TABLET BY MOUTH EVERY DAY           Next OV: No future appointments.        PROTOCOL  Hypert

## 2021-04-05 ENCOUNTER — OFFICE VISIT (OUTPATIENT)
Dept: FAMILY MEDICINE CLINIC | Facility: CLINIC | Age: 56
End: 2021-04-05
Payer: COMMERCIAL

## 2021-04-05 ENCOUNTER — LAB ENCOUNTER (OUTPATIENT)
Dept: LAB | Age: 56
End: 2021-04-05
Attending: FAMILY MEDICINE
Payer: COMMERCIAL

## 2021-04-05 VITALS
OXYGEN SATURATION: 99 % | HEIGHT: 64.75 IN | SYSTOLIC BLOOD PRESSURE: 122 MMHG | DIASTOLIC BLOOD PRESSURE: 78 MMHG | HEART RATE: 78 BPM | BODY MASS INDEX: 31.54 KG/M2 | WEIGHT: 187 LBS | RESPIRATION RATE: 18 BRPM | TEMPERATURE: 98 F

## 2021-04-05 DIAGNOSIS — I10 ESSENTIAL HYPERTENSION: ICD-10-CM

## 2021-04-05 DIAGNOSIS — R53.83 OTHER FATIGUE: ICD-10-CM

## 2021-04-05 DIAGNOSIS — F32.A ANXIETY AND DEPRESSION: ICD-10-CM

## 2021-04-05 DIAGNOSIS — E11.9 TYPE 2 DIABETES MELLITUS WITHOUT COMPLICATION, WITHOUT LONG-TERM CURRENT USE OF INSULIN (HCC): ICD-10-CM

## 2021-04-05 DIAGNOSIS — E11.9 TYPE 2 DIABETES MELLITUS WITHOUT COMPLICATION, WITHOUT LONG-TERM CURRENT USE OF INSULIN (HCC): Primary | ICD-10-CM

## 2021-04-05 DIAGNOSIS — F41.9 ANXIETY AND DEPRESSION: ICD-10-CM

## 2021-04-05 DIAGNOSIS — E78.2 MIXED HYPERLIPIDEMIA: ICD-10-CM

## 2021-04-05 DIAGNOSIS — G47.33 OSA (OBSTRUCTIVE SLEEP APNEA): ICD-10-CM

## 2021-04-05 PROBLEM — G47.09 OTHER INSOMNIA: Status: RESOLVED | Noted: 2019-11-20 | Resolved: 2021-04-05

## 2021-04-05 PROCEDURE — 3061F NEG MICROALBUMINURIA REV: CPT | Performed by: FAMILY MEDICINE

## 2021-04-05 PROCEDURE — 83036 HEMOGLOBIN GLYCOSYLATED A1C: CPT | Performed by: FAMILY MEDICINE

## 2021-04-05 PROCEDURE — 36415 COLL VENOUS BLD VENIPUNCTURE: CPT | Performed by: FAMILY MEDICINE

## 2021-04-05 PROCEDURE — 3078F DIAST BP <80 MM HG: CPT | Performed by: FAMILY MEDICINE

## 2021-04-05 PROCEDURE — 3008F BODY MASS INDEX DOCD: CPT | Performed by: FAMILY MEDICINE

## 2021-04-05 PROCEDURE — 80050 GENERAL HEALTH PANEL: CPT | Performed by: FAMILY MEDICINE

## 2021-04-05 PROCEDURE — 82570 ASSAY OF URINE CREATININE: CPT | Performed by: FAMILY MEDICINE

## 2021-04-05 PROCEDURE — 3074F SYST BP LT 130 MM HG: CPT | Performed by: FAMILY MEDICINE

## 2021-04-05 PROCEDURE — 82043 UR ALBUMIN QUANTITATIVE: CPT | Performed by: FAMILY MEDICINE

## 2021-04-05 PROCEDURE — 80061 LIPID PANEL: CPT | Performed by: FAMILY MEDICINE

## 2021-04-05 PROCEDURE — 99214 OFFICE O/P EST MOD 30 MIN: CPT | Performed by: FAMILY MEDICINE

## 2021-04-05 NOTE — PROGRESS NOTES
Sergey Stubbs is a 60year old female. Patient presents with:  Medication Follow-Up: Talk about medication       HPI:   Patient states she is tired of taking all these medications. She is not sleeping well. She cannot fall asleep.   She has a hist ASPIRATION RIGHT      15+ yrs ago   • REPAIR ING HERNIA,5+Y/O,REDUCIBL       Family History   Problem Relation Age of Onset   • Breast Cancer Mother 46        Social History:  Social History    Tobacco Use      Smoking status: Never Smoker      Smokeless t counseling. She will be referred to [facility] counseling. She has a difficult situation at home where her son lives while struggling with addiction. We will also check her thyroid function and blood count.   The key to at least reducing her medications

## 2021-04-19 RX ORDER — ATORVASTATIN CALCIUM 40 MG/1
TABLET, FILM COATED ORAL
Qty: 90 TABLET | Refills: 2 | Status: SHIPPED | OUTPATIENT
Start: 2021-04-19 | End: 2021-12-16

## 2021-04-19 NOTE — TELEPHONE ENCOUNTER
Last refill #90 on 1/13/2021  Last office visit pertaining to refill on 4/5/2021  No future appointments.   Labs current until 4/5/2022  Refilled per protocol

## 2021-05-06 RX ORDER — ESCITALOPRAM OXALATE 20 MG/1
TABLET ORAL
Qty: 90 TABLET | Refills: 0 | Status: SHIPPED | OUTPATIENT
Start: 2021-05-06 | End: 2021-08-09

## 2021-05-06 RX ORDER — ZOLPIDEM TARTRATE 10 MG/1
TABLET ORAL
Qty: 15 TABLET | Refills: 1 | Status: SHIPPED | OUTPATIENT
Start: 2021-05-06 | End: 2021-07-12

## 2021-05-06 NOTE — TELEPHONE ENCOUNTER
Last refill #90 on 2/8/2021  Last office visit pertaining to refill on 4/5/2021  No future appointments.

## 2021-05-06 NOTE — TELEPHONE ENCOUNTER
Last refill #15 x 1 on 3/11/2021  Last office visit pertaining to refill on 4/5/2021  No future appointments.

## 2021-05-11 ENCOUNTER — TELEPHONE (OUTPATIENT)
Dept: FAMILY MEDICINE CLINIC | Facility: CLINIC | Age: 56
End: 2021-05-11

## 2021-06-02 DIAGNOSIS — E11.9 TYPE 2 DIABETES MELLITUS WITHOUT COMPLICATION, WITHOUT LONG-TERM CURRENT USE OF INSULIN (HCC): ICD-10-CM

## 2021-06-02 NOTE — TELEPHONE ENCOUNTER
Last office visit: 4/5/21  Last refill: 11/30/20  Labs Due: 10/6/21  No future appointments.   Name from pharmacy: METFORMIN HCL 1,000 [address] [place]          Will file in chart as: METFORMIN HCL 1000 MG Oral Tab    Sig: TAKE 1 TABLET BY MOUTH TWICE A DAY WITH M

## 2021-06-28 RX ORDER — LOSARTAN POTASSIUM 50 MG/1
TABLET ORAL
Qty: 90 TABLET | Refills: 0 | Status: SHIPPED | OUTPATIENT
Start: 2021-06-28 | End: 2021-12-09

## 2021-06-28 NOTE — TELEPHONE ENCOUNTER
Last refill #90 on 3/31/2021  Last office visit pertaining to refill on 4/5/2021  No future appointments.   BP Readings from Last 3 Encounters:  04/05/21 : 122/78  11/30/20 : 120/78  06/12/20 : 124/80    Labs current  Refilled per protocol

## 2021-07-12 RX ORDER — ZOLPIDEM TARTRATE 10 MG/1
TABLET ORAL
Qty: 15 TABLET | Refills: 1 | Status: SHIPPED | OUTPATIENT
Start: 2021-07-12 | End: 2021-09-08

## 2021-07-12 NOTE — TELEPHONE ENCOUNTER
Last refill #15 x 1 on 5/6/2021  Last office visit pertaining to refill on 4/5/2021  No future appointments.   Labs current until 4/2022

## 2021-07-30 RX ORDER — CHLORTHALIDONE 25 MG/1
TABLET ORAL
Qty: 90 TABLET | Refills: 0 | Status: SHIPPED | OUTPATIENT
Start: 2021-07-30 | End: 2021-10-29

## 2021-07-30 RX ORDER — AMLODIPINE BESYLATE 5 MG/1
TABLET ORAL
Qty: 90 TABLET | Refills: 0 | Status: SHIPPED | OUTPATIENT
Start: 2021-07-30 | End: 2021-10-29

## 2021-07-30 RX ORDER — METOPROLOL SUCCINATE 50 MG/1
TABLET, EXTENDED RELEASE ORAL
Qty: 90 TABLET | Refills: 0 | Status: SHIPPED | OUTPATIENT
Start: 2021-07-30 | End: 2021-10-29

## 2021-07-30 NOTE — TELEPHONE ENCOUNTER
Last refill on cholorthalidone #90 x 1 on 1/28/2021  Last refill on metoprolol #90 x 1 on 1/28/2021  Last refill on amlodipine #90 x 1 on 1/28/2021    Last office visit pertaining to refill on 4/5/2021  No future appointments.   BP Readings from Last 3 [place]

## 2021-08-09 RX ORDER — ESCITALOPRAM OXALATE 20 MG/1
TABLET ORAL
Qty: 90 TABLET | Refills: 0 | Status: SHIPPED | OUTPATIENT
Start: 2021-08-09 | End: 2021-11-03

## 2021-08-09 NOTE — TELEPHONE ENCOUNTER
Last refill #90 on 5/6/2021  Last office visit pertaining to refill on 4/5/2021  No future appointments.   Labs current   until 10/6/2021

## 2021-09-08 RX ORDER — ZOLPIDEM TARTRATE 10 MG/1
TABLET ORAL
Qty: 15 TABLET | Refills: 1 | Status: SHIPPED | OUTPATIENT
Start: 2021-09-08 | End: 2021-11-02

## 2021-09-08 NOTE — TELEPHONE ENCOUNTER
LOV 04/05/2021        LAST RX 07/12/2021 15 tablets 1 refill    Next OV No future appointments.     PROTOCOL none

## 2021-10-27 NOTE — TELEPHONE ENCOUNTER
LOV: 04/05/21 - due for OV     LAST LAB: 04/05/21 - due for labs, sent UIBLUEPRINTt msg. LAST RX: 7/30/21    Next OV: No future appointments.     PROTOCOL  Hypertension Medications Protocol Failed 10/26/2021 12:25 AM   Protocol Details  Appointment in past 6

## 2021-10-29 ENCOUNTER — TELEPHONE (OUTPATIENT)
Dept: FAMILY MEDICINE CLINIC | Facility: CLINIC | Age: 56
End: 2021-10-29

## 2021-10-29 RX ORDER — METOPROLOL SUCCINATE 50 MG/1
TABLET, EXTENDED RELEASE ORAL
Qty: 90 TABLET | Refills: 0 | Status: SHIPPED | OUTPATIENT
Start: 2021-10-29 | End: 2021-12-16

## 2021-10-29 RX ORDER — AMLODIPINE BESYLATE 5 MG/1
TABLET ORAL
Qty: 90 TABLET | Refills: 0 | Status: SHIPPED | OUTPATIENT
Start: 2021-10-29 | End: 2021-12-16

## 2021-10-29 RX ORDER — CHLORTHALIDONE 25 MG/1
TABLET ORAL
Qty: 90 TABLET | Refills: 0 | Status: SHIPPED | OUTPATIENT
Start: 2021-10-29 | End: 2021-12-16

## 2021-10-29 NOTE — TELEPHONE ENCOUNTER
Future Appointments   Date Time Provider Dwayne Azevedo   12/1/2021  9:15 AM Gigi , SERGO [facility] [place]

## 2021-11-02 RX ORDER — ZOLPIDEM TARTRATE 10 MG/1
TABLET ORAL
Qty: 15 TABLET | Refills: 0 | Status: SHIPPED | OUTPATIENT
Start: 2021-11-02 | End: 2021-12-06

## 2021-11-02 NOTE — TELEPHONE ENCOUNTER
LOV: 04/05/21    LAST LAB:  n/a    LAST RX: 09/08/21      Next OV:   Future Appointments   Date Time Provider Dwayne Jolly   12/1/2021  9:15 AM SERGO Guzman [facility] [place]       PROTOCOL: n/a

## 2021-11-03 RX ORDER — ESCITALOPRAM OXALATE 20 MG/1
TABLET ORAL
Qty: 90 TABLET | Refills: 0 | Status: SHIPPED | OUTPATIENT
Start: 2021-11-03 | End: 2022-01-31

## 2021-11-03 NOTE — TELEPHONE ENCOUNTER
LOV: 04/05/21    LAST LAB: 04/05/21    LAST RX: 08/09/21    Next OV:   Future Appointments   Date Time Provider Dwayne Azevedo   12/1/2021  9:15 AM SERGO Zhao [facility] [place]       PROTOCOL: n/a

## 2021-12-01 ENCOUNTER — OFFICE VISIT (OUTPATIENT)
Dept: FAMILY MEDICINE CLINIC | Facility: CLINIC | Age: 56
End: 2021-12-01
Payer: COMMERCIAL

## 2021-12-01 VITALS
DIASTOLIC BLOOD PRESSURE: 80 MMHG | HEIGHT: 64.75 IN | RESPIRATION RATE: 16 BRPM | WEIGHT: 185 LBS | TEMPERATURE: 98 F | HEART RATE: 72 BPM | OXYGEN SATURATION: 98 % | SYSTOLIC BLOOD PRESSURE: 120 MMHG | BODY MASS INDEX: 31.2 KG/M2

## 2021-12-01 DIAGNOSIS — R53.83 OTHER FATIGUE: ICD-10-CM

## 2021-12-01 DIAGNOSIS — E78.2 MIXED HYPERLIPIDEMIA: ICD-10-CM

## 2021-12-01 DIAGNOSIS — F99 INSOMNIA DUE TO OTHER MENTAL DISORDER: ICD-10-CM

## 2021-12-01 DIAGNOSIS — R00.2 PALPITATIONS: ICD-10-CM

## 2021-12-01 DIAGNOSIS — N95.2 POSTMENOPAUSAL ATROPHIC VAGINITIS: ICD-10-CM

## 2021-12-01 DIAGNOSIS — Z00.00 GENERAL MEDICAL EXAM: Primary | ICD-10-CM

## 2021-12-01 DIAGNOSIS — R06.00 DYSPNEA ON EXERTION: ICD-10-CM

## 2021-12-01 DIAGNOSIS — F32.A ANXIETY AND DEPRESSION: ICD-10-CM

## 2021-12-01 DIAGNOSIS — Z01.419 ENCOUNTER FOR WELL WOMAN EXAM WITH ROUTINE GYNECOLOGICAL EXAM: ICD-10-CM

## 2021-12-01 DIAGNOSIS — Z12.31 ENCOUNTER FOR SCREENING MAMMOGRAM FOR MALIGNANT NEOPLASM OF BREAST: ICD-10-CM

## 2021-12-01 DIAGNOSIS — I10 ESSENTIAL HYPERTENSION: ICD-10-CM

## 2021-12-01 DIAGNOSIS — Z80.3 FAMILY HISTORY OF BREAST CANCER: ICD-10-CM

## 2021-12-01 DIAGNOSIS — Z12.11 COLON CANCER SCREENING: ICD-10-CM

## 2021-12-01 DIAGNOSIS — Z23 NEED FOR VACCINATION: ICD-10-CM

## 2021-12-01 DIAGNOSIS — F51.05 INSOMNIA DUE TO OTHER MENTAL DISORDER: ICD-10-CM

## 2021-12-01 DIAGNOSIS — F41.9 ANXIETY AND DEPRESSION: ICD-10-CM

## 2021-12-01 DIAGNOSIS — E11.9 TYPE 2 DIABETES MELLITUS WITHOUT COMPLICATION, WITHOUT LONG-TERM CURRENT USE OF INSULIN (HCC): ICD-10-CM

## 2021-12-01 PROCEDURE — 90471 IMMUNIZATION ADMIN: CPT | Performed by: NURSE PRACTITIONER

## 2021-12-01 PROCEDURE — 90750 HZV VACC RECOMBINANT IM: CPT | Performed by: NURSE PRACTITIONER

## 2021-12-01 PROCEDURE — 99396 PREV VISIT EST AGE 40-64: CPT | Performed by: NURSE PRACTITIONER

## 2021-12-01 PROCEDURE — 80061 LIPID PANEL: CPT | Performed by: FAMILY MEDICINE

## 2021-12-01 PROCEDURE — 84443 ASSAY THYROID STIM HORMONE: CPT | Performed by: FAMILY MEDICINE

## 2021-12-01 PROCEDURE — 3008F BODY MASS INDEX DOCD: CPT | Performed by: NURSE PRACTITIONER

## 2021-12-01 PROCEDURE — 88175 CYTOPATH C/V AUTO FLUID REDO: CPT | Performed by: NURSE PRACTITIONER

## 2021-12-01 PROCEDURE — 83036 HEMOGLOBIN GLYCOSYLATED A1C: CPT | Performed by: FAMILY MEDICINE

## 2021-12-01 PROCEDURE — 3074F SYST BP LT 130 MM HG: CPT | Performed by: NURSE PRACTITIONER

## 2021-12-01 PROCEDURE — 87624 HPV HI-RISK TYP POOLED RSLT: CPT | Performed by: NURSE PRACTITIONER

## 2021-12-01 PROCEDURE — 80053 COMPREHEN METABOLIC PANEL: CPT | Performed by: FAMILY MEDICINE

## 2021-12-01 PROCEDURE — 3079F DIAST BP 80-89 MM HG: CPT | Performed by: NURSE PRACTITIONER

## 2021-12-01 RX ORDER — ESTRADIOL 4 UG/1
1 INSERT VAGINAL DAILY
Qty: 18 EACH | Refills: 1 | Status: SHIPPED | OUTPATIENT
Start: 2021-12-01

## 2021-12-01 NOTE — PROGRESS NOTES
HPI: HPI   Patient is here for physical and pap. Continues to have issues with vaginal dryness and painful intercourse. Did not do well with estrogen cream. It was messy. Previously tried estrogen patches but was allergic to adhesive.  Wondering if she sh 0   • CHLORTHALIDONE 25 MG Oral Tab TAKE 1 TABLET BY MOUTH EVERY DAY IN THE MORNING WITH FOOD 90 tablet 0   • METOPROLOL SUCCINATE 50 MG Oral Tablet 24 Hr TAKE 1 TABLET BY MOUTH EVERY DAY 90 tablet 0   • LOSARTAN POTASSIUM 50 MG Oral Tab TAKE 1 TABLET BY M Positive for dyspareunia (from dryness). Negative for dysuria, frequency, hematuria, pelvic pain, vaginal bleeding, vaginal discharge and vaginal pain. Musculoskeletal: Negative for back pain and myalgias.         Chronic, all over stiffness occasionall tenderness, discharge or friability. Uterus: Normal.       Adnexa:         Right: No mass or tenderness. Left: No mass or tenderness.         Rectum: Normal.      Comments: Vaginal atrophy present  Musculoskeletal:         General: Normal rang advise against oral HT. Will try different vaginal estrogen  Orders:  -     Estradiol Starter Pack (IMVEXXY STARTER PACK) 4 MCG Vaginal Insert; Place 1 each vaginally daily.  Place 1 insert vaginal daily for 2 weeks, then twice weekly for maintenance    Oth

## 2021-12-02 DIAGNOSIS — E11.9 TYPE 2 DIABETES MELLITUS WITHOUT COMPLICATION, WITHOUT LONG-TERM CURRENT USE OF INSULIN (HCC): Primary | ICD-10-CM

## 2021-12-03 DIAGNOSIS — E11.9 TYPE 2 DIABETES MELLITUS WITHOUT COMPLICATION, WITHOUT LONG-TERM CURRENT USE OF INSULIN (HCC): ICD-10-CM

## 2021-12-03 NOTE — TELEPHONE ENCOUNTER
Requested Prescriptions     Pending Prescriptions Disp Refills   • METFORMIN HCL 1000 MG Oral Tab [Pharmacy Med Name: METFORMIN HCL 1,000 MG TABLET] 180 tablet 1     Sig: TAKE 1 TABLET BY MOUTH TWICE A DAY WITH MEALS     Last refill #180 x 1 on 6/2/2021  L

## 2021-12-06 RX ORDER — ZOLPIDEM TARTRATE 10 MG/1
TABLET ORAL
Qty: 15 TABLET | Refills: 0 | Status: SHIPPED | OUTPATIENT
Start: 2021-12-06 | End: 2022-01-11

## 2021-12-06 NOTE — TELEPHONE ENCOUNTER
LOV 12/01/2021      LAST RX 11/02/2021 15 tablet 0 refills    Next OV No future appointments.     PROTOCOL none

## 2021-12-09 RX ORDER — LOSARTAN POTASSIUM 50 MG/1
TABLET ORAL
Qty: 90 TABLET | Refills: 1 | Status: SHIPPED | OUTPATIENT
Start: 2021-12-09

## 2021-12-09 NOTE — TELEPHONE ENCOUNTER
Requested Prescriptions     Pending Prescriptions Disp Refills   • LOSARTAN 50 MG Oral Tab [Pharmacy Med Name: LOSARTAN POTASSIUM 50 MG TAB] 90 tablet 0     Sig: TAKE 1 TABLET BY MOUTH EVERY DAY     Last refill #90 on 6/28/2021  Last office visit pertainin

## 2021-12-15 RX ORDER — ESCITALOPRAM OXALATE 20 MG/1
TABLET ORAL
Qty: 90 TABLET | Refills: 0 | OUTPATIENT
Start: 2021-12-15

## 2021-12-15 NOTE — TELEPHONE ENCOUNTER
Last refill - 11/3/21 - #90   Last office visit - 12/1/21  Contacted pharmacy. Patient should have meds until 2/3/22. Contacted patient. She will call back when is due for meds.

## 2021-12-16 RX ORDER — CHLORTHALIDONE 25 MG/1
TABLET ORAL
Qty: 90 TABLET | Refills: 0 | Status: SHIPPED | OUTPATIENT
Start: 2021-12-16

## 2021-12-16 RX ORDER — AMLODIPINE BESYLATE 5 MG/1
TABLET ORAL
Qty: 90 TABLET | Refills: 0 | Status: SHIPPED | OUTPATIENT
Start: 2021-12-16

## 2021-12-16 RX ORDER — ATORVASTATIN CALCIUM 40 MG/1
TABLET, FILM COATED ORAL
Qty: 90 TABLET | Refills: 2 | Status: SHIPPED | OUTPATIENT
Start: 2021-12-16

## 2021-12-16 RX ORDER — METOPROLOL SUCCINATE 50 MG/1
TABLET, EXTENDED RELEASE ORAL
Qty: 90 TABLET | Refills: 0 | Status: SHIPPED | OUTPATIENT
Start: 2021-12-16

## 2021-12-16 RX ORDER — SEMAGLUTIDE 1.34 MG/ML
0.5 INJECTION, SOLUTION SUBCUTANEOUS WEEKLY
Qty: 4.5 ML | Refills: 3 | Status: SHIPPED | OUTPATIENT
Start: 2021-12-16

## 2021-12-16 NOTE — TELEPHONE ENCOUNTER
Last office visit: 12/1/21  Last refill: Metoprolol, Chlorthalidone, Amlodipine: 10/29/21, Atorvastatin: 4/19/21, Ozempic: 1/8/21  Labs Due: 6/2/22  No future appointments.   Name from pharmacy: METOPROLOL [facility] ER 50 MG TAB          Will file in chart as: M Will file in chart as: ATORVASTATIN 40 MG Oral Tab    Sig: TAKE 1 TABLET BY MOUTH EVERY DAY AT NIGHT    Disp:  90 tablet    Refills:  2    Start: 12/15/2021    Class: Normal    Non-formulary    Last ordered: 8 months ago by DO Bobby Wlels re

## 2022-01-11 RX ORDER — ZOLPIDEM TARTRATE 10 MG/1
TABLET ORAL
Qty: 15 TABLET | Refills: 0 | Status: SHIPPED | OUTPATIENT
Start: 2022-01-11 | End: 2022-02-07

## 2022-01-11 NOTE — TELEPHONE ENCOUNTER
No protocol for medication. Last office visit:  12/01/21 for physical  Last refill:  12/06/21  #15, no refills     No future appointments.

## 2022-01-17 ENCOUNTER — TELEPHONE (OUTPATIENT)
Dept: FAMILY MEDICINE CLINIC | Facility: CLINIC | Age: 57
End: 2022-01-17

## 2022-01-17 NOTE — TELEPHONE ENCOUNTER
Per referral department in regards to echo stress test:    Patient currently does not have active insurance coverage on file. Please update with active coverage and we will process with prior authorization process.     Verified with Sharon BARGER and insurance is

## 2022-01-18 NOTE — TELEPHONE ENCOUNTER
Insurance info is up-to-date. Referral switched from \"open\" to \"pending review\". Forwarded referral to [facility] from referral dept.

## 2022-01-31 RX ORDER — ESCITALOPRAM OXALATE 20 MG/1
TABLET ORAL
Qty: 90 TABLET | Refills: 0 | Status: SHIPPED | OUTPATIENT
Start: 2022-01-31

## 2022-01-31 NOTE — TELEPHONE ENCOUNTER
Requested Prescriptions     Pending Prescriptions Disp Refills   • ESCITALOPRAM 20 MG Oral Tab [Pharmacy Med Name: ESCITALOPRAM 20 MG TABLET] 90 tablet 0     Sig: TAKE 1 TABLET BY MOUTH EVERY DAY     Last refill #90 on 11/3/2021  Last office visit pertaini

## 2022-02-07 RX ORDER — ZOLPIDEM TARTRATE 10 MG/1
TABLET ORAL
Qty: 15 TABLET | Refills: 0 | Status: SHIPPED | OUTPATIENT
Start: 2022-02-07 | End: 2022-03-07

## 2022-02-10 RX ORDER — ESTRADIOL 4 UG/1
1 INSERT VAGINAL DAILY
Qty: 18 EACH | Refills: 1 | Status: SHIPPED | OUTPATIENT
Start: 2022-02-10 | End: 2022-02-11

## 2022-02-11 RX ORDER — ESTRADIOL 4 UG/1
4 INSERT VAGINAL
Qty: 8 EACH | Refills: 2 | Status: SHIPPED | OUTPATIENT
Start: 2022-02-11 | End: 2022-03-11

## 2022-03-04 ENCOUNTER — TELEPHONE (OUTPATIENT)
Dept: FAMILY MEDICINE CLINIC | Facility: CLINIC | Age: 57
End: 2022-03-04

## 2022-03-04 NOTE — TELEPHONE ENCOUNTER
Patient is due for a colonoscopy. She does not want to drive all the way to [place]. Can she get a referral for  here in [place]? This nurse also gave her the number for central scheduling to schedule her mammogram and stress test. Both orders are in.  Please call on Monday with GI referral/recommendations for someone local.

## 2022-03-05 ENCOUNTER — TELEPHONE (OUTPATIENT)
Dept: FAMILY MEDICINE CLINIC | Facility: CLINIC | Age: 57
End: 2022-03-05

## 2022-03-05 NOTE — TELEPHONE ENCOUNTER
Last filled 2/7/2022  #15    LAST SEEN 12/01/2021 PER APN FOR CPX  Last seen Dr Ira Jefferson 4/5/2021    This has already been routed to Dr Ira Jefferson yesterday      Request has been sent to Dr Ira Jefferson to address; he is back in office on Monday, 3/7/2022

## 2022-03-07 RX ORDER — ZOLPIDEM TARTRATE 10 MG/1
TABLET ORAL
Qty: 15 TABLET | Refills: 0 | Status: SHIPPED | OUTPATIENT
Start: 2022-03-07 | End: 2022-04-04

## 2022-03-07 NOTE — TELEPHONE ENCOUNTER
DO Tono Torres, PK 2 days ago         [facility] to see Dr Reginald Ponce as long as not P

## 2022-03-10 ENCOUNTER — TELEPHONE (OUTPATIENT)
Dept: FAMILY MEDICINE CLINIC | Facility: CLINIC | Age: 57
End: 2022-03-10

## 2022-03-10 NOTE — TELEPHONE ENCOUNTER
Luann from [facility] (Dr Boston Morales) office called. They received colon cancer screening referral for pt. She wanted to know if we had results from pt 2016 colonoscopy & if we can send a demo page.   fax [phone]

## 2022-03-10 NOTE — TELEPHONE ENCOUNTER
We do not have the report available from 2016. Looked in chart and CareEverywhere.      Faxed facesheet per request.

## 2022-04-04 RX ORDER — METOPROLOL SUCCINATE 50 MG/1
TABLET, EXTENDED RELEASE ORAL
Qty: 90 TABLET | Refills: 0 | Status: SHIPPED | OUTPATIENT
Start: 2022-04-04

## 2022-04-04 RX ORDER — AMLODIPINE BESYLATE 5 MG/1
TABLET ORAL
Qty: 90 TABLET | Refills: 0 | Status: SHIPPED | OUTPATIENT
Start: 2022-04-04

## 2022-04-04 RX ORDER — CHLORTHALIDONE 25 MG/1
TABLET ORAL
Qty: 90 TABLET | Refills: 0 | Status: SHIPPED | OUTPATIENT
Start: 2022-04-04

## 2022-04-04 RX ORDER — ZOLPIDEM TARTRATE 10 MG/1
TABLET ORAL
Qty: 15 TABLET | Refills: 0 | Status: SHIPPED | OUTPATIENT
Start: 2022-04-04

## 2022-05-02 RX ORDER — ESCITALOPRAM OXALATE 20 MG/1
TABLET ORAL
Qty: 90 TABLET | Refills: 0 | Status: SHIPPED | OUTPATIENT
Start: 2022-05-02

## 2022-05-06 ENCOUNTER — TELEPHONE (OUTPATIENT)
Dept: FAMILY MEDICINE CLINIC | Facility: CLINIC | Age: 57
End: 2022-05-06

## 2022-05-06 DIAGNOSIS — E78.2 MIXED HYPERLIPIDEMIA: ICD-10-CM

## 2022-05-06 DIAGNOSIS — R06.00 DYSPNEA ON EXERTION: Primary | ICD-10-CM

## 2022-05-06 DIAGNOSIS — I10 ESSENTIAL HYPERTENSION: ICD-10-CM

## 2022-05-06 DIAGNOSIS — R00.2 PALPITATIONS: ICD-10-CM

## 2022-05-06 NOTE — TELEPHONE ENCOUNTER
Patient has new insurance since April, [employer] Access; patient scheduled 5/12/2022 for echo. We need to re submit order under new insurance; Was previously ordered in Dec 2021 with [facility] Perez [facility]. .    Call made to patient and instructed need to get us new insurance card; we need to resubmit order for echo under new insurance. she will get it to us. Told her don't do echo until you know authorized under new insurance.

## 2022-05-09 ENCOUNTER — TELEPHONE (OUTPATIENT)
Dept: FAMILY MEDICINE CLINIC | Facility: CLINIC | Age: 57
End: 2022-05-09

## 2022-05-09 NOTE — TELEPHONE ENCOUNTER
WE HAVE HER NEW INSURANCE CARD ENTERED NOW SO SHE SAID THAT SHE NEEDS A REFERRAL FOR THE STRESS TEST

## 2022-05-12 ENCOUNTER — TELEPHONE (OUTPATIENT)
Dept: FAMILY MEDICINE CLINIC | Facility: CLINIC | Age: 57
End: 2022-05-12

## 2022-05-12 NOTE — TELEPHONE ENCOUNTER
Pt advised prior Sunshine Aguilar is pending authorization. Will notify pt when we have a response. She verbalized understanding.

## 2022-05-18 NOTE — TELEPHONE ENCOUNTER
Spoke to Priscilla from central scheduling dept. Prior Bridgette Mari has been approved by Cass Putnam and pt may keep her scheduled appt on 5/24/22. Future Appointments   Date Time Provider Dwayne Azevedo   5/24/2022  1:45 PM [facility] CARD STRESS ECHO RM 1 ECARD ECHO [facility] AT [facility]   6/8/2022 10:40 AM PF ALEX RM1 PF KHANG Valdez     Went over test instructions. Pt verbalized understanding.

## 2022-05-21 ENCOUNTER — ORDER TRANSCRIPTION (OUTPATIENT)
Dept: ADMINISTRATIVE | Facility: HOSPITAL | Age: 57
End: 2022-05-21

## 2022-05-21 ENCOUNTER — TELEPHONE (OUTPATIENT)
Dept: FAMILY MEDICINE CLINIC | Facility: CLINIC | Age: 57
End: 2022-05-21

## 2022-05-21 DIAGNOSIS — E78.2 MIXED HYPERLIPIDEMIA: ICD-10-CM

## 2022-05-21 DIAGNOSIS — Z11.59 SCREENING FOR VIRAL DISEASE: ICD-10-CM

## 2022-05-21 DIAGNOSIS — I10 ESSENTIAL HYPERTENSION: ICD-10-CM

## 2022-05-21 DIAGNOSIS — R06.00 DYSPNEA ON EXERTION: Primary | ICD-10-CM

## 2022-05-21 DIAGNOSIS — Z01.818 PRE-PROCEDURAL EXAMINATION: Primary | ICD-10-CM

## 2022-05-21 DIAGNOSIS — R00.2 PALPITATIONS: ICD-10-CM

## 2022-05-21 NOTE — TELEPHONE ENCOUNTER
Received a call from [facility] (cardiac diagnostic dept), requesting stress test order to be changed from dobutamine stress test to a regular cardiac ECHO test that would allow pt to walk on a treadmill. Please sign pended order if you agree.

## 2022-05-24 ENCOUNTER — HOSPITAL ENCOUNTER (OUTPATIENT)
Dept: CV DIAGNOSTICS | Facility: HOSPITAL | Age: 57
Discharge: HOME | End: 2022-05-24
Attending: NURSE PRACTITIONER
Payer: COMMERCIAL

## 2022-05-24 ENCOUNTER — LAB ENCOUNTER (OUTPATIENT)
Dept: LAB | Facility: HOSPITAL | Age: 57
End: 2022-05-24
Attending: FAMILY MEDICINE
Payer: COMMERCIAL

## 2022-05-24 DIAGNOSIS — R00.2 PALPITATIONS: ICD-10-CM

## 2022-05-24 DIAGNOSIS — E78.2 MIXED HYPERLIPIDEMIA: ICD-10-CM

## 2022-05-24 DIAGNOSIS — R06.00 DYSPNEA ON EXERTION: ICD-10-CM

## 2022-05-24 DIAGNOSIS — I10 ESSENTIAL HYPERTENSION: ICD-10-CM

## 2022-05-24 DIAGNOSIS — Z01.818 PRE-PROCEDURAL EXAMINATION: ICD-10-CM

## 2022-05-24 DIAGNOSIS — Z11.59 SCREENING FOR VIRAL DISEASE: ICD-10-CM

## 2022-05-24 LAB — SARS-COV-2 RNA RESP QL NAA+PROBE: NOT DETECTED

## 2022-05-24 PROCEDURE — 93018 CV STRESS TEST I&R ONLY: CPT | Performed by: FAMILY MEDICINE

## 2022-05-24 PROCEDURE — 93350 STRESS TTE ONLY: CPT | Performed by: FAMILY MEDICINE

## 2022-05-24 PROCEDURE — 93017 CV STRESS TEST TRACING ONLY: CPT | Performed by: FAMILY MEDICINE

## 2022-05-27 ENCOUNTER — TELEPHONE (OUTPATIENT)
Dept: FAMILY MEDICINE CLINIC | Facility: CLINIC | Age: 57
End: 2022-05-27

## 2022-05-27 DIAGNOSIS — E11.9 TYPE 2 DIABETES MELLITUS WITHOUT COMPLICATION, WITHOUT LONG-TERM CURRENT USE OF INSULIN (HCC): Primary | ICD-10-CM

## 2022-06-02 RX ORDER — ZOLPIDEM TARTRATE 10 MG/1
TABLET ORAL
Qty: 15 TABLET | Refills: 0 | Status: SHIPPED | OUTPATIENT
Start: 2022-06-02

## 2022-06-02 NOTE — TELEPHONE ENCOUNTER
Due for A1C and urine micro. Pt has new ins plan, [employer]. Preferred lab is [facility]. She will be going to [place] to complete labs.

## 2022-06-04 NOTE — TELEPHONE ENCOUNTER
LOV: 12/01/21    LAST LAB: 12/09/21    LAST RX: 12/01/21    Next OV:   Future Appointments   Date Time Provider Dwayne Jolly   6/8/2022 10:40 AM [facility] [place]       PROTOCOL  Hypertension Medications Protocol Failed 06/04/2022 07:56 AM   Protocol Details  Appointment in past 6 or next 3 months    CMP or BMP in past 12 months    Last serum creatinine< 2.0

## 2022-06-08 ENCOUNTER — HOSPITAL ENCOUNTER (OUTPATIENT)
Dept: MAMMOGRAPHY | Age: 57
Discharge: HOME | End: 2022-06-08
Attending: NURSE PRACTITIONER
Payer: COMMERCIAL

## 2022-06-08 DIAGNOSIS — Z12.31 ENCOUNTER FOR SCREENING MAMMOGRAM FOR MALIGNANT NEOPLASM OF BREAST: ICD-10-CM

## 2022-06-08 DIAGNOSIS — Z80.3 FAMILY HISTORY OF BREAST CANCER: ICD-10-CM

## 2022-06-08 PROCEDURE — 77067 SCR MAMMO BI INCL CAD: CPT | Performed by: NURSE PRACTITIONER

## 2022-06-08 PROCEDURE — 77063 BREAST TOMOSYNTHESIS BI: CPT | Performed by: NURSE PRACTITIONER

## 2022-06-13 ENCOUNTER — TELEPHONE (OUTPATIENT)
Dept: FAMILY MEDICINE CLINIC | Facility: CLINIC | Age: 57
End: 2022-06-13

## 2022-06-14 ENCOUNTER — TELEPHONE (OUTPATIENT)
Dept: FAMILY MEDICINE CLINIC | Facility: CLINIC | Age: 57
End: 2022-06-14

## 2022-06-14 DIAGNOSIS — I10 ESSENTIAL HYPERTENSION: Primary | ICD-10-CM

## 2022-06-14 RX ORDER — LOSARTAN POTASSIUM 50 MG/1
50 TABLET ORAL DAILY
Qty: 90 TABLET | Refills: 0 | Status: SHIPPED | OUTPATIENT
Start: 2022-06-14

## 2022-06-14 NOTE — TELEPHONE ENCOUNTER
Mamm results given and APN recommendations on annual mamm and MRI but 6 months apart. Pt asking for refill of Losartan. Has appointment with [facility] next week for blood work.

## 2022-06-14 NOTE — TELEPHONE ENCOUNTER
Prior authorization approved Case ID: [id number]      Payer:  [id number]  [address]  [id number] [facility]  [phone]    [id number]; Review Type:Prior Auth; Coverage Start Date:06/13/2022; Coverage End Date:06/13/2023;     Approval Details    Authorized from June 13, 2022 to June 13, 2023

## 2022-06-16 ENCOUNTER — TELEPHONE (OUTPATIENT)
Dept: FAMILY MEDICINE CLINIC | Facility: CLINIC | Age: 57
End: 2022-06-16

## 2022-06-16 RX ORDER — LOSARTAN POTASSIUM 50 MG/1
TABLET ORAL
Qty: 90 TABLET | Refills: 0 | OUTPATIENT
Start: 2022-06-16

## 2022-06-20 ENCOUNTER — TELEPHONE (OUTPATIENT)
Dept: FAMILY MEDICINE CLINIC | Facility: CLINIC | Age: 57
End: 2022-06-20

## 2022-06-20 NOTE — TELEPHONE ENCOUNTER
Future Appointments   Date Time Provider Dwayne Jolly   6/21/2022  3:00 PM Aubrie Lugo MD [facility] [place]

## 2022-06-20 NOTE — TELEPHONE ENCOUNTER
PT SPOUSE TESTED POSITIVE FOR COVID-  SHE IS SICK ALSO BUT IS HOME TESTING NEGATIVE-SHOULD SHE GET A TEST SOMEWHERE-    WOULD LIKE A CALL TO DISCUSS SYMPTOMS-    Mark Anderson Yes

## 2022-06-20 NOTE — TELEPHONE ENCOUNTER
Message left on identified cell phone to call back to discuss symptoms. The patient is Stable - Low risk of patient condition declining or worsening    Shift Goals  Clinical Goals: Skin integrity, labwork  Patient Goals: Rest, comfort  Family Goals: PATTI    Progress made toward(s) clinical / shift goals: Encouraging turns and ambulation.    Patient is not progressing towards the following goals: N/A      Problem: Knowledge Deficit - Standard  Goal: Patient and family/care givers will demonstrate understanding of plan of care, disease process/condition, diagnostic tests and medications  Outcome: Progressing     Problem: Fall Risk  Goal: Patient will remain free from falls  Outcome: Progressing     Problem: Skin Integrity  Goal: Skin integrity is maintained or improved  Outcome: Progressing

## 2022-06-20 NOTE — TELEPHONE ENCOUNTER
Discussed with Dr. Camilla Sevilla - recommended for follow-up. Appointment scheduled for tomorrow. Instructed to remain in car and call office when arrives. Verbalized understanding.

## 2022-06-21 ENCOUNTER — OFFICE VISIT (OUTPATIENT)
Dept: FAMILY MEDICINE CLINIC | Facility: CLINIC | Age: 57
End: 2022-06-21
Payer: COMMERCIAL

## 2022-06-21 VITALS
BODY MASS INDEX: 31 KG/M2 | RESPIRATION RATE: 18 BRPM | WEIGHT: 187.5 LBS | TEMPERATURE: 99 F | DIASTOLIC BLOOD PRESSURE: 82 MMHG | OXYGEN SATURATION: 95 % | HEART RATE: 85 BPM | SYSTOLIC BLOOD PRESSURE: 138 MMHG

## 2022-06-21 DIAGNOSIS — R05.9 COUGH: Primary | ICD-10-CM

## 2022-06-21 PROCEDURE — 3075F SYST BP GE 130 - 139MM HG: CPT | Performed by: INTERNAL MEDICINE

## 2022-06-21 PROCEDURE — 99214 OFFICE O/P EST MOD 30 MIN: CPT | Performed by: INTERNAL MEDICINE

## 2022-06-21 PROCEDURE — 3079F DIAST BP 80-89 MM HG: CPT | Performed by: INTERNAL MEDICINE

## 2022-06-21 RX ORDER — PREDNISONE 20 MG/1
20 TABLET ORAL DAILY
Qty: 7 TABLET | Refills: 0 | Status: SHIPPED | OUTPATIENT
Start: 2022-06-21 | End: 2022-06-28

## 2022-06-22 LAB — SARS-COV-2 RNA RESP QL NAA+PROBE: DETECTED

## 2022-06-23 ENCOUNTER — TELEPHONE (OUTPATIENT)
Dept: FAMILY MEDICINE CLINIC | Facility: CLINIC | Age: 57
End: 2022-06-23

## 2022-06-23 NOTE — TELEPHONE ENCOUNTER
Spoke with patient - aware of Covid results. Reviewed isolation x 5 days then additional 5 days of masking. Still coughing. If cough worsens or shortness or breath - needs to be re-evaluated. Pt verbalized understanding.

## 2022-06-28 ENCOUNTER — TELEPHONE (OUTPATIENT)
Dept: FAMILY MEDICINE CLINIC | Facility: CLINIC | Age: 57
End: 2022-06-28

## 2022-06-28 NOTE — TELEPHONE ENCOUNTER
Patient is still coughing, low grade temp of 99, head congestion, cough, lost sense of smell and left ear pain. She finished the steroids yesterday and her blood sugars have been elevated which she expected to happen. She denies SOB. She has been taking OTC DM cough medication. She said that her biggest complaint is the ear pain/pressure and fatigue.

## 2022-06-28 NOTE — TELEPHONE ENCOUNTER
She may have this for weeks. continue to rest.  NO WORK/ ISOLATION for a total of 10 days  She can continue to try and drain the sinuses using sudafed, afrin BID x 3 days then flonase.

## 2022-06-28 NOTE — TELEPHONE ENCOUNTER
They are for short term if she prefer she can use un-medicated nasal saline or a netti pot nd mucinex.  But the sinuses are not draining well, that is the pressurized feeling

## 2022-06-28 NOTE — TELEPHONE ENCOUNTER
Patient advised. She asked if these medications are safe to use with blood pressure medications. She thinks her sister had a bad reaction to Afrin and had to go to the ER.

## 2022-07-07 ENCOUNTER — TELEPHONE (OUTPATIENT)
Dept: FAMILY MEDICINE CLINIC | Facility: CLINIC | Age: 57
End: 2022-07-07

## 2022-07-07 NOTE — TELEPHONE ENCOUNTER
SHE HAD A VISIT 2 WEEKS AGO TO SEE DR Mimi Maravilla AND WAS TESTED POSITIVE FOR COVID THE NEXT DAY AND SHE WANTS TO ASK WHEN SHE CAN START TO GO BACK OUT

## 2022-07-07 NOTE — TELEPHONE ENCOUNTER
Patient said that she is better. She said that she still has a slight cough occasionally and she still has congestion, but does not have. She has been taking night time cough and congestion medication.

## 2022-07-12 RX ORDER — METOPROLOL SUCCINATE 50 MG/1
50 TABLET, EXTENDED RELEASE ORAL DAILY
Qty: 90 TABLET | Refills: 0 | Status: SHIPPED | OUTPATIENT
Start: 2022-07-12

## 2022-07-12 RX ORDER — ZOLPIDEM TARTRATE 10 MG/1
TABLET ORAL
Qty: 30 TABLET | Refills: 5 | Status: SHIPPED | OUTPATIENT
Start: 2022-07-12 | End: 2022-08-11

## 2022-07-12 RX ORDER — AMLODIPINE BESYLATE 5 MG/1
5 TABLET ORAL DAILY
Qty: 90 TABLET | Refills: 0 | Status: SHIPPED | OUTPATIENT
Start: 2022-07-12

## 2022-08-03 DIAGNOSIS — E11.9 TYPE 2 DIABETES MELLITUS WITHOUT COMPLICATION, WITHOUT LONG-TERM CURRENT USE OF INSULIN (HCC): ICD-10-CM

## 2022-08-03 NOTE — TELEPHONE ENCOUNTER
Last Hgba1c was 12/1/21, she will have her Hgba1C and Microalbumin, lab orders faxed to [id number] w/Dr May Kenney 6/21/22  Last refill 12/3/21

## 2022-08-03 NOTE — TELEPHONE ENCOUNTER
METFORMIN HCL 1000 MG Oral Tab   [facility] [place]. Pt. Has question re: her A1C test please call her.

## 2022-08-04 RX ORDER — CHLORTHALIDONE 25 MG/1
25 TABLET ORAL DAILY
Qty: 90 TABLET | Refills: 0 | Status: SHIPPED | OUTPATIENT
Start: 2022-08-04

## 2022-08-04 RX ORDER — ESCITALOPRAM OXALATE 20 MG/1
20 TABLET ORAL DAILY
Qty: 90 TABLET | Refills: 0 | Status: SHIPPED | OUTPATIENT
Start: 2022-08-04

## 2022-08-24 ENCOUNTER — MED REC SCAN ONLY (OUTPATIENT)
Dept: FAMILY MEDICINE CLINIC | Facility: CLINIC | Age: 57
End: 2022-08-24

## 2022-08-25 DIAGNOSIS — N95.2 POSTMENOPAUSAL ATROPHIC VAGINITIS: ICD-10-CM

## 2022-08-25 RX ORDER — ESTRADIOL 4 UG/1
4 INSERT VAGINAL
Qty: 8 EACH | Refills: 2 | Status: SHIPPED | OUTPATIENT
Start: 2022-08-25 | End: 2022-09-22

## 2022-08-25 NOTE — TELEPHONE ENCOUNTER
LAST FILLED 2/11/2022 #8 WITH 2 rf    (FYI- PREMARIN VAG CREAM POPS UP ON PREFERRED LIST)    LAST SEEN 6/21/2022 FOR COUGH WITH DR Frank Ching

## 2022-09-16 DIAGNOSIS — I10 ESSENTIAL HYPERTENSION: ICD-10-CM

## 2022-09-16 RX ORDER — LOSARTAN POTASSIUM 50 MG/1
50 TABLET ORAL DAILY
Qty: 90 TABLET | Refills: 0 | Status: SHIPPED | OUTPATIENT
Start: 2022-09-16

## 2022-10-04 DIAGNOSIS — E11.9 TYPE 2 DIABETES MELLITUS WITHOUT COMPLICATION, WITHOUT LONG-TERM CURRENT USE OF INSULIN (HCC): ICD-10-CM

## 2022-10-06 RX ORDER — ATORVASTATIN CALCIUM 40 MG/1
40 TABLET, FILM COATED ORAL NIGHTLY
Qty: 90 TABLET | Refills: 2 | Status: SHIPPED | OUTPATIENT
Start: 2022-10-06

## 2022-10-15 RX ORDER — METOPROLOL SUCCINATE 50 MG/1
TABLET, EXTENDED RELEASE ORAL
Qty: 90 TABLET | Refills: 0 | Status: SHIPPED | OUTPATIENT
Start: 2022-10-15

## 2022-10-15 RX ORDER — AMLODIPINE BESYLATE 5 MG/1
5 TABLET ORAL DAILY
Qty: 90 TABLET | Refills: 0 | Status: SHIPPED | OUTPATIENT
Start: 2022-10-15

## 2022-10-15 NOTE — TELEPHONE ENCOUNTER
Hypertension Medications Protocol Passed 10/15/2022 08:47 AM   Protocol Details  CMP or BMP in past 12 months    Last serum creatinine< 2.0    Appointment in past 6 or next 3 months        Last office visit:  06/21/22  Last lab:  12/01/21  BP Readings from Last 3 Encounters:  06/21/22 : 138/82  12/01/21 : 120/80  04/05/21 : 122/78    Amlodipine:  07/12/22  #90, no refills  Metoprolol:  07/12/22  #90, no refills    No future appointments.

## 2022-11-08 RX ORDER — CHLORTHALIDONE 25 MG/1
25 TABLET ORAL DAILY
Qty: 90 TABLET | Refills: 0 | Status: SHIPPED | OUTPATIENT
Start: 2022-11-08

## 2022-11-08 NOTE — TELEPHONE ENCOUNTER
Medication refilled per protocol. Mychart sent advising patient she is due for office visit and labs in December.   Matthew ENCINAS MA, 11/08/22, 7:33 AM

## 2022-11-29 ENCOUNTER — TELEPHONE (OUTPATIENT)
Dept: FAMILY MEDICINE CLINIC | Facility: CLINIC | Age: 57
End: 2022-11-29

## 2022-11-29 DIAGNOSIS — E11.9 TYPE 2 DIABETES MELLITUS WITHOUT COMPLICATION, WITHOUT LONG-TERM CURRENT USE OF INSULIN (HCC): Primary | ICD-10-CM

## 2022-11-29 DIAGNOSIS — I10 ESSENTIAL HYPERTENSION: ICD-10-CM

## 2022-11-29 RX ORDER — GLIMEPIRIDE 2 MG/1
2 TABLET ORAL
Qty: 90 TABLET | Refills: 0 | Status: SHIPPED | OUTPATIENT
Start: 2022-11-29 | End: 2023-02-27

## 2022-11-29 NOTE — TELEPHONE ENCOUNTER
Patient said that her blood sugar has been \"really high 300-400\". She is taking Metformin and Ozempic. She said that she came here to have labs done but her insurance won't cover them here. She would like orders sent to [facility] What other labs in addition to Hgba1c and Microalbumin? Can she have a physical on 12/8/22, it is an 1130 appointment, she is due 12/1/22. Should her medications be adjusted prior to her visit?

## 2022-11-29 NOTE — TELEPHONE ENCOUNTER
You are maxed on metformin, you can take 1 mg of ozempic but this will take a few weeks to make an impact on the sugars, I suggest you do this and add amaryl 2 mg in am.  Labs and new meds ordered.

## 2022-11-30 PROCEDURE — 3046F HEMOGLOBIN A1C LEVEL >9.0%: CPT | Performed by: INTERNAL MEDICINE

## 2022-11-30 PROCEDURE — 3061F NEG MICROALBUMINURIA REV: CPT | Performed by: INTERNAL MEDICINE

## 2022-12-01 LAB
ABSOLUTE BASOPHILS: 40 CELLS/UL (ref 0–200)
ABSOLUTE EOSINOPHILS: 280 CELLS/UL (ref 15–500)
ABSOLUTE LYMPHOCYTES: 2010 CELLS/UL (ref 850–3900)
ABSOLUTE MONOCYTES: 550 CELLS/UL (ref 200–950)
ABSOLUTE NEUTROPHILS: 2120 CELLS/UL (ref 1500–7800)
ALBUMIN/GLOBULIN RATIO: 1.6 (CALC) (ref 1–2.5)
ALBUMIN: 4.2 G/DL (ref 3.6–5.1)
ALKALINE PHOSPHATASE: 66 U/L (ref 37–153)
ALT: 13 U/L (ref 6–29)
AST: 16 U/L (ref 10–35)
BASOPHILS: 0.8 %
BILIRUBIN, TOTAL: 0.6 MG/DL (ref 0.2–1.2)
BUN: 13 MG/DL (ref 7–25)
CALCIUM: 10.1 MG/DL (ref 8.6–10.4)
CARBON DIOXIDE: 33 MMOL/L (ref 20–32)
CHLORIDE: 94 MMOL/L (ref 98–110)
CHOL/HDLC RATIO: 2.6 (CALC)
CHOLESTEROL, TOTAL: 116 MG/DL
CREATININE, RANDOM URINE: 180 MG/DL (ref 20–275)
CREATININE: 0.66 MG/DL (ref 0.5–1.03)
EGFR: 101 ML/MIN/1.73M2
EOSINOPHILS: 5.6 %
GLOBULIN: 2.6 G/DL (CALC) (ref 1.9–3.7)
GLUCOSE: 271 MG/DL (ref 65–99)
HDL CHOLESTEROL: 45 MG/DL
HEMATOCRIT: 42.9 % (ref 35–45)
HEMOGLOBIN A1C: 11.3 % OF TOTAL HGB
HEMOGLOBIN: 14.4 G/DL (ref 11.7–15.5)
LDL-CHOLESTEROL: 53 MG/DL (CALC)
LYMPHOCYTES: 40.2 %
MCH: 27.7 PG (ref 27–33)
MCHC: 33.6 G/DL (ref 32–36)
MCV: 82.5 FL (ref 80–100)
MICROALBUMIN/CREATININE RATIO, RANDOM URINE: 4 MCG/MG CREAT
MICROALBUMIN: 0.8 MG/DL
MONOCYTES: 11 %
MPV: 12.2 FL (ref 7.5–12.5)
NEUTROPHILS: 42.4 %
NON-HDL CHOLESTEROL: 71 MG/DL (CALC)
PLATELET COUNT: 278 THOUSAND/UL (ref 140–400)
POTASSIUM: 3.9 MMOL/L (ref 3.5–5.3)
PROTEIN, TOTAL: 6.8 G/DL (ref 6.1–8.1)
RDW: 12.2 % (ref 11–15)
RED BLOOD CELL COUNT: 5.2 MILLION/UL (ref 3.8–5.1)
SODIUM: 136 MMOL/L (ref 135–146)
TRIGLYCERIDES: 94 MG/DL
WHITE BLOOD CELL COUNT: 5 THOUSAND/UL (ref 3.8–10.8)

## 2022-12-08 ENCOUNTER — OFFICE VISIT (OUTPATIENT)
Dept: FAMILY MEDICINE CLINIC | Facility: CLINIC | Age: 57
End: 2022-12-08
Payer: COMMERCIAL

## 2022-12-08 VITALS
DIASTOLIC BLOOD PRESSURE: 80 MMHG | TEMPERATURE: 98 F | WEIGHT: 181.25 LBS | HEART RATE: 82 BPM | OXYGEN SATURATION: 99 % | HEIGHT: 64.75 IN | SYSTOLIC BLOOD PRESSURE: 138 MMHG | RESPIRATION RATE: 18 BRPM | BODY MASS INDEX: 30.57 KG/M2

## 2022-12-08 DIAGNOSIS — Z79.4 TYPE 2 DIABETES MELLITUS WITH KETOACIDOSIS AND COMA, WITH LONG-TERM CURRENT USE OF INSULIN (HCC): Primary | ICD-10-CM

## 2022-12-08 DIAGNOSIS — E11.11 TYPE 2 DIABETES MELLITUS WITH KETOACIDOSIS AND COMA, WITH LONG-TERM CURRENT USE OF INSULIN (HCC): Primary | ICD-10-CM

## 2022-12-08 PROCEDURE — 99214 OFFICE O/P EST MOD 30 MIN: CPT | Performed by: INTERNAL MEDICINE

## 2022-12-08 PROCEDURE — 3075F SYST BP GE 130 - 139MM HG: CPT | Performed by: INTERNAL MEDICINE

## 2022-12-08 PROCEDURE — 3079F DIAST BP 80-89 MM HG: CPT | Performed by: INTERNAL MEDICINE

## 2022-12-08 PROCEDURE — 3008F BODY MASS INDEX DOCD: CPT | Performed by: INTERNAL MEDICINE

## 2022-12-08 RX ORDER — ZOLPIDEM TARTRATE 10 MG/1
10 TABLET ORAL NIGHTLY PRN
COMMUNITY
Start: 2022-10-20

## 2022-12-09 DIAGNOSIS — I10 ESSENTIAL HYPERTENSION: ICD-10-CM

## 2022-12-09 RX ORDER — LOSARTAN POTASSIUM 50 MG/1
TABLET ORAL
Qty: 90 TABLET | Refills: 0 | Status: SHIPPED | OUTPATIENT
Start: 2022-12-09

## 2022-12-09 RX ORDER — CHLORTHALIDONE 25 MG/1
25 TABLET ORAL DAILY
Qty: 90 TABLET | Refills: 0 | Status: SHIPPED | OUTPATIENT
Start: 2022-12-09

## 2022-12-11 DIAGNOSIS — N95.2 POSTMENOPAUSAL ATROPHIC VAGINITIS: ICD-10-CM

## 2022-12-12 RX ORDER — ESTRADIOL 4 UG/1
INSERT VAGINAL
Qty: 8 EACH | Refills: 5 | Status: SHIPPED | OUTPATIENT
Start: 2022-12-12

## 2022-12-12 NOTE — TELEPHONE ENCOUNTER
Gynecology Medication Protocol Passed 12/11/2022 01:30 PM    PASS--PENDING LAST PAP WNL--VIA MANUAL LOOKUP    Mammogram in past 12 months    Physical or Pelvic/Breast in past 12 or next 3 mos--VIA MANUAL LOOKUP       [facility] [address] [phone], [phone]     Last visit 12/8/22  Last pap 12/21/21 repeat in 2 years  Last mammo 6/8/22  Last refill 8/25/22

## 2023-01-13 ENCOUNTER — TELEPHONE (OUTPATIENT)
Dept: FAMILY MEDICINE CLINIC | Facility: CLINIC | Age: 58
End: 2023-01-13

## 2023-01-16 RX ORDER — METOPROLOL SUCCINATE 50 MG/1
TABLET, EXTENDED RELEASE ORAL
Qty: 90 TABLET | Refills: 0 | Status: SHIPPED | OUTPATIENT
Start: 2023-01-16

## 2023-01-16 RX ORDER — AMLODIPINE BESYLATE 5 MG/1
5 TABLET ORAL DAILY
Qty: 90 TABLET | Refills: 0 | Status: SHIPPED | OUTPATIENT
Start: 2023-01-16

## 2023-01-19 DIAGNOSIS — E55.9 VITAMIN D DEFICIENCY: Primary | ICD-10-CM

## 2023-01-19 DIAGNOSIS — E11.11 TYPE 2 DIABETES MELLITUS WITH KETOACIDOSIS AND COMA, WITH LONG-TERM CURRENT USE OF INSULIN (HCC): ICD-10-CM

## 2023-01-19 DIAGNOSIS — Z79.4 TYPE 2 DIABETES MELLITUS WITH KETOACIDOSIS AND COMA, WITH LONG-TERM CURRENT USE OF INSULIN (HCC): ICD-10-CM

## 2023-01-19 RX ORDER — BLOOD SUGAR DIAGNOSTIC
STRIP MISCELLANEOUS
Qty: 100 STRIP | Refills: 1 | Status: SHIPPED | OUTPATIENT
Start: 2023-01-19 | End: 2023-01-19

## 2023-01-19 RX ORDER — BLOOD SUGAR DIAGNOSTIC
STRIP MISCELLANEOUS
Qty: 100 STRIP | Refills: 1 | Status: SHIPPED | OUTPATIENT
Start: 2023-01-19 | End: 2024-01-19

## 2023-01-19 RX ORDER — LANCETS 33 GAUGE
1 EACH MISCELLANEOUS DAILY
Qty: 100 EACH | Refills: 3 | Status: SHIPPED | OUTPATIENT
Start: 2023-01-19 | End: 2024-01-19

## 2023-01-21 DIAGNOSIS — E11.9 TYPE 2 DIABETES MELLITUS WITHOUT COMPLICATION, WITHOUT LONG-TERM CURRENT USE OF INSULIN (HCC): ICD-10-CM

## 2023-01-31 ENCOUNTER — TELEPHONE (OUTPATIENT)
Dept: FAMILY MEDICINE CLINIC | Facility: CLINIC | Age: 58
End: 2023-01-31

## 2023-01-31 NOTE — TELEPHONE ENCOUNTER
Not due for A1C until after 2/30/2023 so we will do it when you return and she can get COVID vaccine anytime.

## 2023-01-31 NOTE — TELEPHONE ENCOUNTER
Pt. Is asking when she is due to come in again and when should she get her A1C again. She is going to [facility] in 2 weeks and she wants to know if she should get her next covid booster before she goes. She is fine with a call back tomorrow.

## 2023-03-15 ENCOUNTER — OFFICE VISIT (OUTPATIENT)
Dept: FAMILY MEDICINE CLINIC | Facility: CLINIC | Age: 58
End: 2023-03-15
Payer: COMMERCIAL

## 2023-03-15 VITALS
DIASTOLIC BLOOD PRESSURE: 78 MMHG | WEIGHT: 177.25 LBS | OXYGEN SATURATION: 98 % | TEMPERATURE: 99 F | SYSTOLIC BLOOD PRESSURE: 122 MMHG | HEART RATE: 74 BPM | HEIGHT: 64.75 IN | RESPIRATION RATE: 16 BRPM | BODY MASS INDEX: 29.89 KG/M2

## 2023-03-15 DIAGNOSIS — I10 ESSENTIAL HYPERTENSION: ICD-10-CM

## 2023-03-15 DIAGNOSIS — R53.82 CHRONIC FATIGUE: Primary | ICD-10-CM

## 2023-03-15 DIAGNOSIS — E11.9 TYPE 2 DIABETES MELLITUS WITHOUT COMPLICATION, WITHOUT LONG-TERM CURRENT USE OF INSULIN (HCC): ICD-10-CM

## 2023-03-15 DIAGNOSIS — Z00.00 WELL ADULT EXAM: ICD-10-CM

## 2023-03-15 PROCEDURE — 3074F SYST BP LT 130 MM HG: CPT | Performed by: INTERNAL MEDICINE

## 2023-03-15 PROCEDURE — 99214 OFFICE O/P EST MOD 30 MIN: CPT | Performed by: INTERNAL MEDICINE

## 2023-03-15 PROCEDURE — 3008F BODY MASS INDEX DOCD: CPT | Performed by: INTERNAL MEDICINE

## 2023-03-15 PROCEDURE — 90715 TDAP VACCINE 7 YRS/> IM: CPT | Performed by: INTERNAL MEDICINE

## 2023-03-15 PROCEDURE — 90471 IMMUNIZATION ADMIN: CPT | Performed by: INTERNAL MEDICINE

## 2023-03-15 PROCEDURE — 90750 HZV VACC RECOMBINANT IM: CPT | Performed by: INTERNAL MEDICINE

## 2023-03-15 PROCEDURE — 3078F DIAST BP <80 MM HG: CPT | Performed by: INTERNAL MEDICINE

## 2023-03-15 PROCEDURE — 90472 IMMUNIZATION ADMIN EACH ADD: CPT | Performed by: INTERNAL MEDICINE

## 2023-03-15 RX ORDER — METOPROLOL SUCCINATE 50 MG/1
50 TABLET, EXTENDED RELEASE ORAL DAILY
Qty: 90 TABLET | Refills: 3 | Status: SHIPPED | OUTPATIENT
Start: 2023-03-15

## 2023-03-15 RX ORDER — ZOLPIDEM TARTRATE 10 MG/1
10 TABLET ORAL NIGHTLY PRN
Qty: 90 TABLET | Refills: 0 | Status: SHIPPED | OUTPATIENT
Start: 2023-03-15 | End: 2023-06-13

## 2023-03-15 RX ORDER — AMLODIPINE BESYLATE 5 MG/1
5 TABLET ORAL DAILY
Qty: 90 TABLET | Refills: 0 | Status: SHIPPED | OUTPATIENT
Start: 2023-03-15

## 2023-03-15 RX ORDER — LOSARTAN POTASSIUM 100 MG/1
50 TABLET ORAL DAILY
Qty: 90 TABLET | Refills: 3 | Status: SHIPPED | OUTPATIENT
Start: 2023-03-15 | End: 2023-06-13

## 2023-03-15 RX ORDER — CHLORTHALIDONE 25 MG/1
25 TABLET ORAL DAILY
Qty: 90 TABLET | Refills: 0 | Status: SHIPPED | OUTPATIENT
Start: 2023-03-15

## 2023-03-15 RX ORDER — GLIMEPIRIDE 2 MG/1
2 TABLET ORAL
COMMUNITY
End: 2023-03-15

## 2023-03-15 RX ORDER — ESCITALOPRAM OXALATE 20 MG/1
20 TABLET ORAL DAILY
Qty: 90 TABLET | Refills: 3 | Status: SHIPPED | OUTPATIENT
Start: 2023-03-15 | End: 2023-06-13

## 2023-03-16 DIAGNOSIS — I10 ESSENTIAL HYPERTENSION: ICD-10-CM

## 2023-03-16 RX ORDER — LOSARTAN POTASSIUM 50 MG/1
TABLET ORAL
Qty: 90 TABLET | Refills: 0 | OUTPATIENT
Start: 2023-03-16

## 2023-03-17 PROCEDURE — 3051F HG A1C>EQUAL 7.0%<8.0%: CPT | Performed by: INTERNAL MEDICINE

## 2023-03-17 PROCEDURE — 3061F NEG MICROALBUMINURIA REV: CPT | Performed by: INTERNAL MEDICINE

## 2023-03-21 LAB
ABSOLUTE BASOPHILS: 41 CELLS/UL (ref 0–200)
ABSOLUTE EOSINOPHILS: 148 CELLS/UL (ref 15–500)
ABSOLUTE LYMPHOCYTES: 1163 CELLS/UL (ref 850–3900)
ABSOLUTE MONOCYTES: 571 CELLS/UL (ref 200–950)
ABSOLUTE NEUTROPHILS: 3177 CELLS/UL (ref 1500–7800)
ALBUMIN/GLOBULIN RATIO: 1.7 (CALC) (ref 1–2.5)
ALBUMIN: 4 G/DL (ref 3.6–5.1)
ALKALINE PHOSPHATASE: 63 U/L (ref 37–153)
ALT: 9 U/L (ref 6–29)
ANA SCREEN, IFA: POSITIVE
AST: 12 U/L (ref 10–35)
BASOPHILS: 0.8 %
BILIRUBIN, TOTAL: 0.5 MG/DL (ref 0.2–1.2)
BUN: 11 MG/DL (ref 7–25)
CALCIUM: 9.2 MG/DL (ref 8.6–10.4)
CARBON DIOXIDE: 30 MMOL/L (ref 20–32)
CHLORIDE: 98 MMOL/L (ref 98–110)
CREATININE, RANDOM URINE: 139 MG/DL (ref 20–275)
CREATININE: 0.6 MG/DL (ref 0.5–1.03)
DNA (DS) ANTIBODY: 10 IU/ML
EGFR: 103 ML/MIN/1.73M2
EOSINOPHILS: 2.9 %
GLOBULIN: 2.3 G/DL (CALC) (ref 1.9–3.7)
GLUCOSE: 153 MG/DL (ref 65–99)
HEMATOCRIT: 39.9 % (ref 35–45)
HEMOGLOBIN A1C: 7 % OF TOTAL HGB
HEMOGLOBIN: 13.3 G/DL (ref 11.7–15.5)
LYMPHOCYTES: 22.8 %
MCH: 28 PG (ref 27–33)
MCHC: 33.3 G/DL (ref 32–36)
MCV: 84 FL (ref 80–100)
MICROALBUMIN/CREATININE RATIO, RANDOM URINE: 6 MCG/MG CREAT
MICROALBUMIN: 0.8 MG/DL
MONOCYTES: 11.2 %
MPV: 12 FL (ref 7.5–12.5)
NEUTROPHILS: 62.3 %
PLATELET COUNT: 217 THOUSAND/UL (ref 140–400)
POTASSIUM: 3.3 MMOL/L (ref 3.5–5.3)
PROTEIN, TOTAL: 6.3 G/DL (ref 6.1–8.1)
RDW: 12.8 % (ref 11–15)
RED BLOOD CELL COUNT: 4.75 MILLION/UL (ref 3.8–5.1)
SODIUM: 138 MMOL/L (ref 135–146)
T3, TOTAL: 88 NG/DL (ref 76–181)
T4, FREE: 0.9 NG/DL (ref 0.8–1.8)
TSH: 1.88 MIU/L (ref 0.4–4.5)
VITAMIN B12: 364 PG/ML (ref 200–1100)
WHITE BLOOD CELL COUNT: 5.1 THOUSAND/UL (ref 3.8–10.8)

## 2023-03-22 RX ORDER — POTASSIUM CHLORIDE 750 MG/1
10 TABLET, FILM COATED, EXTENDED RELEASE ORAL DAILY
Qty: 90 TABLET | Refills: 3 | Status: SHIPPED | OUTPATIENT
Start: 2023-03-22 | End: 2023-06-20

## 2023-03-23 ENCOUNTER — TELEPHONE (OUTPATIENT)
Dept: FAMILY MEDICINE CLINIC | Facility: CLINIC | Age: 58
End: 2023-03-23

## 2023-03-23 DIAGNOSIS — R76.8 POSITIVE ANA (ANTINUCLEAR ANTIBODY): Primary | ICD-10-CM

## 2023-03-23 DIAGNOSIS — I10 ESSENTIAL HYPERTENSION: ICD-10-CM

## 2023-03-23 RX ORDER — GLIMEPIRIDE 2 MG/1
2 TABLET ORAL
Qty: 90 TABLET | Refills: 3 | Status: SHIPPED | OUTPATIENT
Start: 2023-03-23 | End: 2023-06-21

## 2023-03-23 RX ORDER — LOSARTAN POTASSIUM 100 MG/1
100 TABLET ORAL DAILY
Qty: 90 TABLET | Refills: 3 | Status: SHIPPED | OUTPATIENT
Start: 2023-03-23 | End: 2023-06-21

## 2023-03-23 NOTE — TELEPHONE ENCOUNTER
She saw dr mcclain last week, who does she recommend for the rheumatologist? And has questions about potassium

## 2023-03-23 NOTE — TELEPHONE ENCOUNTER
Patient advised to restart Glimepiride and take Losartan 100mg. Nalini at [facility] advised of dosage change on Losartan.

## 2023-03-23 NOTE — TELEPHONE ENCOUNTER
Patient advised, she said that she was told to increase the Losartan but Dr Skyla Harper sent a script in for Losartan 100mg but to take 1/2 tab. She also said that she had been taking Glimepiride but ran out. Is she supposed to restart it?

## 2023-03-23 NOTE — TELEPHONE ENCOUNTER
Patient advised. Verbalizes understanding. Name and number for Dr. Rajesh Maria given. Patient wants to know if she needs to continue on Glimepiride. Also, states that she was taking Losartan 50 mg daily. New script that was sent was also for 50 mg daily. She thought that she was cutting back on this medication. Please advise.

## 2023-03-23 NOTE — TELEPHONE ENCOUNTER
I want her to keep everything the same. Do not be disappointed, I think her control is better and I can tell she has made some positive life style changes.

## 2023-03-23 NOTE — TELEPHONE ENCOUNTER
I put the referral in for [address] [place] [address]  and I gave her a supplement for potassium--the diuretic causes potassium to be lost buy the kidney

## 2023-04-19 ENCOUNTER — TELEPHONE (OUTPATIENT)
Dept: FAMILY MEDICINE CLINIC | Facility: CLINIC | Age: 58
End: 2023-04-19

## 2023-04-19 DIAGNOSIS — N95.2 POSTMENOPAUSAL ATROPHIC VAGINITIS: ICD-10-CM

## 2023-04-19 RX ORDER — ESTRADIOL 4 UG/1
4 INSERT VAGINAL
Qty: 8 EACH | Refills: 5 | Status: SHIPPED | OUTPATIENT
Start: 2023-04-19

## 2023-04-19 NOTE — TELEPHONE ENCOUNTER
Last OV w/ Guanaco Curet 3/15/23  Next Pap Smear due 12/1/24  Last refill 12/12/22 #8 w/5 refills
yes

## 2023-05-18 ENCOUNTER — TELEPHONE (OUTPATIENT)
Dept: FAMILY MEDICINE CLINIC | Facility: CLINIC | Age: 58
End: 2023-05-18

## 2023-05-18 NOTE — TELEPHONE ENCOUNTER
Meredith Starks from pharmacy calling for update on prior auth for Estradiol [facility]) 4 MCG Vaginal Insert.

## 2023-05-18 NOTE — TELEPHONE ENCOUNTER
[facility] but there is a script ready that costs $225. LM for patient to [facility]. Does patient want to pay this or try something else? Has patient had a hysterectomy, does she have a uterus?

## 2023-05-24 NOTE — TELEPHONE ENCOUNTER
Patient said she tried two other creams but she didn't like them. She has had an ablation but not a hysterectomy. She said she did try orals but they did not help and she has breast cancer in her family. She c/o hot flashed, difficulty sleeping and painful intercourse. Is there anything she can try OTC?

## 2023-05-31 NOTE — TELEPHONE ENCOUNTER
Per Hay at [facility] appeals a letter needs to be faxed to [facility] appeals at [phone] with documentation as to why the medication is medically necessary and clinical notes. Reference # [id number].

## 2023-06-21 RX ORDER — ATORVASTATIN CALCIUM 40 MG/1
TABLET, FILM COATED ORAL
Qty: 90 TABLET | Refills: 1 | Status: SHIPPED | OUTPATIENT
Start: 2023-06-21

## 2023-06-21 NOTE — TELEPHONE ENCOUNTER
Cholesterol Medication Protocol Passed 06/21/2023 12:39 AM   Protocol Details  ALT < 80    ALT resulted within past year    Lipid panel within past 12 months    Appointment within past 12 or next 3 months     Last visit 3/15/2023  Future Appointments   Date Time Provider Dwayne Azevedo   8/14/2023  1:00 PM Gisselle Paredes DO [facility] [address]     Lipids  11/30/22  ALT  9 3/17/2023

## 2023-07-14 RX ORDER — SEMAGLUTIDE 1.34 MG/ML
1 INJECTION, SOLUTION SUBCUTANEOUS
Qty: 1 EACH | Refills: 3 | Status: SHIPPED | OUTPATIENT
Start: 2023-07-14

## 2023-07-14 NOTE — TELEPHONE ENCOUNTER
Diabetic Medication Protocol [id number]/14/2023 01:02 AM   Protocol Details HgBA1C procedure resulted in past 6 months    Last HgBA1C < 7.5    Microalbumin procedure in past 12 months or taking ACE/ARB    Appointment in past 6 or next 3 months   Last visit 3/15/2023  A1C  3/17/2023 7.0  Microalbumin  3/17/2023 0.8

## 2023-07-20 RX ORDER — BLOOD SUGAR DIAGNOSTIC
STRIP MISCELLANEOUS
Qty: 100 STRIP | Refills: 1 | Status: SHIPPED | OUTPATIENT
Start: 2023-07-20

## 2023-07-20 NOTE — TELEPHONE ENCOUNTER
Onetouch Verio Vitro Strip    Diabetic Supplies Protocol [id number]/20/2023 01:34 AM    Appointment in the past 12 or next 3 months      Last office visit:  3/15/23    Future Appointments   Date Time Provider Dwayne Azevedo   8/14/2023  1:00 PM Omer Adelaide, DO [facility] [address]   Last filled:  1/19/23  100 strips with 1 refill   Last labs:  3/21/23

## 2023-07-25 RX ORDER — CHLORTHALIDONE 25 MG/1
25 TABLET ORAL DAILY
Qty: 90 TABLET | Refills: 0 | Status: SHIPPED | OUTPATIENT
Start: 2023-07-25

## 2023-07-25 NOTE — TELEPHONE ENCOUNTER
LOV 03/15/2023    LAST LAB 03/17/2023    LAST RX  03/15/2023    Next OV   Future Appointments   Date Time Provider Dwayne Sanchezi   8/14/2023  1:00 PM Delmi Del Toro DO [facility] [address]         PROTOCOL    Name from pharmacy: CHLORTHALIDONE 25 MG TABLET          Will file in chart as: CHLORTHALIDONE 25 MG Oral Tab    Sig: Take 1 tablet (25 mg total) by mouth daily.     Disp: 90 tablet    Refills: 0 (Pharmacy requested: Not specified)    Start: 7/25/2023    Class: Normal    Non-formulary    Last ordered: 4 months ago by Polina Bowens MD Last refill: 4/21/2023    Rx #: [id number]    Hypertension Medications Protocol [id number]/25/2023 02:03 PM   Protocol Details CMP or BMP in past 12 months    Last serum creatinine< 2.0    Appointment in past 6 or next 3 months

## 2023-08-02 ENCOUNTER — TELEPHONE (OUTPATIENT)
Dept: FAMILY MEDICINE CLINIC | Facility: CLINIC | Age: 58
End: 2023-08-02

## 2023-08-02 RX ORDER — ZOLPIDEM TARTRATE 10 MG/1
10 TABLET ORAL NIGHTLY PRN
Qty: 90 TABLET | Refills: 0 | Status: SHIPPED | OUTPATIENT
Start: 2023-08-02

## 2023-08-02 RX ORDER — AMLODIPINE BESYLATE 5 MG/1
5 TABLET ORAL DAILY
Qty: 90 TABLET | Refills: 0 | Status: SHIPPED | OUTPATIENT
Start: 2023-08-02

## 2023-08-02 NOTE — TELEPHONE ENCOUNTER
Zolpidem: 3/15/23 #90 w/ 0 refills  Amlodipine: 3/15/23 #90 w/ 0 refills    Last OV: 3/15/23  Last labs: 3/17/23    Future Appointments   Date Time Provider Dwayne Azevedo   8/14/2023  1:00 PM Elizabeth Giles,  [facility] [address]

## 2023-08-03 NOTE — TELEPHONE ENCOUNTER
Patient advised.  Advised she does need a physical. Appointment scheduled with Dr Lizeth Gonzalez 8/30/23 at 10am.

## 2023-08-14 ENCOUNTER — OFFICE VISIT (OUTPATIENT)
Dept: RHEUMATOLOGY | Facility: CLINIC | Age: 58
End: 2023-08-14
Payer: COMMERCIAL

## 2023-08-14 VITALS
HEART RATE: 76 BPM | WEIGHT: 180 LBS | TEMPERATURE: 98 F | HEIGHT: 65 IN | RESPIRATION RATE: 16 BRPM | OXYGEN SATURATION: 98 % | BODY MASS INDEX: 29.99 KG/M2 | SYSTOLIC BLOOD PRESSURE: 122 MMHG | DIASTOLIC BLOOD PRESSURE: 80 MMHG

## 2023-08-14 DIAGNOSIS — R76.8 DS DNA ANTIBODY POSITIVE: Primary | ICD-10-CM

## 2023-08-14 DIAGNOSIS — R76.8 ANA POSITIVE: ICD-10-CM

## 2023-08-14 DIAGNOSIS — E55.9 VITAMIN D DEFICIENCY: ICD-10-CM

## 2023-08-14 DIAGNOSIS — R53.82 CHRONIC FATIGUE: ICD-10-CM

## 2023-08-14 RX ORDER — POTASSIUM CHLORIDE 750 MG/1
10 TABLET, FILM COATED, EXTENDED RELEASE ORAL DAILY
COMMUNITY
Start: 2023-06-22

## 2023-08-14 RX ORDER — GLIMEPIRIDE 2 MG/1
2 TABLET ORAL
COMMUNITY
Start: 2023-06-22

## 2023-08-24 RX ORDER — ESTRADIOL 10 UG/1
TABLET VAGINAL
Qty: 24 TABLET | Refills: 1 | Status: SHIPPED | OUTPATIENT
Start: 2023-08-24

## 2023-08-24 NOTE — TELEPHONE ENCOUNTER
Last refill: 06/16/23  Qty: 30  W/ 0 refills  Last ov: 03/15/23    Requested Prescriptions     Pending Prescriptions Disp Refills    YUVAFEM 10 MCG Vaginal Tab [Pharmacy Med Name: YUVAFEM 10 MCG VAGINAL INSERT] 24 tablet 1     Sig: INSERT 1 TABLET VAGINALLY ONCE DAILY FOR 1 WEEK, THEN 2 TIMES PER WEEK     Future Appointments   Date Time Provider Dwayne Azevedo   8/30/2023 10:00 AM Dbe Ríos MD [facility] [place]

## 2023-08-26 LAB
14.3.3 ETA PROTEIN: <0.2 NG/ML
ABSOLUTE BASOPHILS: 58 CELLS/UL (ref 0–200)
ABSOLUTE EOSINOPHILS: 387 CELLS/UL (ref 15–500)
ABSOLUTE LYMPHOCYTES: 2482 CELLS/UL (ref 850–3900)
ABSOLUTE MONOCYTES: 562 CELLS/UL (ref 200–950)
ABSOLUTE NEUTROPHILS: 3811 CELLS/UL (ref 1500–7800)
ALBUMIN/GLOBULIN RATIO: 1.7 (CALC) (ref 1–2.5)
ALBUMIN: 4.1 G/DL (ref 3.6–5.1)
ALKALINE PHOSPHATASE: 61 U/L (ref 37–153)
ALT: 10 U/L (ref 6–29)
ANA SCREEN, IFA: POSITIVE
APPEARANCE: CLEAR
AST: 14 U/L (ref 10–35)
B2 GLYCOPROTEIN I (IGA)AB: <2 U/ML
B2 GLYCOPROTEIN I (IGG)AB: <2 U/ML
B2 GLYCOPROTEIN I(IGM)AB: 3 U/ML
BASOPHILS: 0.8 %
BILIRUBIN, TOTAL: 0.4 MG/DL (ref 0.2–1.2)
BILIRUBIN: NEGATIVE
BUN: 16 MG/DL (ref 7–25)
C-REACTIVE PROTEIN: 0.2 MG/L
CALCIUM: 10.3 MG/DL (ref 8.6–10.4)
CARBON DIOXIDE: 32 MMOL/L (ref 20–32)
CARDIOLIPIN AB (IGA): <2 APL-U/ML
CARDIOLIPIN AB (IGG): <2 GPL-U/ML
CARDIOLIPIN AB (IGM): 2.8 MPL-U/ML
CHLORIDE: 100 MMOL/L (ref 98–110)
COLOR: YELLOW
COMPLEMENT COMPONENT C3C: 107 MG/DL (ref 83–193)
COMPLEMENT COMPONENT C4C: 16 MG/DL (ref 15–57)
CREATININE, RANDOM URINE: 139 MG/DL (ref 20–275)
CREATININE: 0.62 MG/DL (ref 0.5–1.03)
CYCLIC CITRULLINATED$PEPTIDE (CCP) AB (IGG): <16 UNITS
DNA AB (DS) CRITHIDIA,IFA: NEGATIVE
EGFR: 103 ML/MIN/1.73M2
EOSINOPHILS: 5.3 %
GLOBULIN: 2.4 G/DL (CALC) (ref 1.9–3.7)
GLUCOSE: 97 MG/DL (ref 65–139)
GLUCOSE: NEGATIVE
HEMATOCRIT: 40.9 % (ref 35–45)
HEMOGLOBIN: 13.4 G/DL (ref 11.7–15.5)
KETONES: NEGATIVE
LYMPHOCYTES: 34 %
MCH: 27.6 PG (ref 27–33)
MCHC: 32.8 G/DL (ref 32–36)
MCV: 84.3 FL (ref 80–100)
MONOCYTES: 7.7 %
MPV: 12.2 FL (ref 7.5–12.5)
NEUTROPHILS: 52.2 %
NITRITE: NEGATIVE
OCCULT BLOOD: NEGATIVE
PH: 7.5 (ref 5–8)
PLATELET COUNT: 272 THOUSAND/UL (ref 140–400)
POTASSIUM: 3.6 MMOL/L (ref 3.5–5.3)
PROTEIN, TOTAL, RANDOM UR: 17 MG/DL (ref 5–24)
PROTEIN, TOTAL: 6.5 G/DL (ref 6.1–8.1)
PROTEIN/CREATININE RATIO: 0.12 MG/MG CREAT (ref 0.02–0.18)
PROTEIN/CREATININE RATIO: 122 MG/G CREAT (ref 24–184)
RDW: 13.2 % (ref 11–15)
RED BLOOD CELL COUNT: 4.85 MILLION/UL (ref 3.8–5.1)
RHEUMATOID FACTOR (IGA): <5 U
RHEUMATOID FACTOR (IGG): <5 U
RHEUMATOID FACTOR (IGM): <5 U
SED RATE BY MODIFIED$WESTERGREN: 2 MM/H
SODIUM: 141 MMOL/L (ref 135–146)
SPECIFIC GRAVITY: 1.02 (ref 1–1.03)
THYROGLOBULIN ANTIBODIES: <1 IU/ML
THYROID PEROXIDASE$ANTIBODIES: <1 IU/ML
VITAMIN D, 25-OH, TOTAL: 34 NG/ML (ref 30–100)
WHITE BLOOD CELL COUNT: 7.3 THOUSAND/UL (ref 3.8–10.8)

## 2023-08-30 ENCOUNTER — OFFICE VISIT (OUTPATIENT)
Dept: FAMILY MEDICINE CLINIC | Facility: CLINIC | Age: 58
End: 2023-08-30
Payer: COMMERCIAL

## 2023-08-30 VITALS
TEMPERATURE: 99 F | SYSTOLIC BLOOD PRESSURE: 122 MMHG | HEIGHT: 65 IN | RESPIRATION RATE: 16 BRPM | BODY MASS INDEX: 29.66 KG/M2 | HEART RATE: 82 BPM | WEIGHT: 178 LBS | DIASTOLIC BLOOD PRESSURE: 76 MMHG | OXYGEN SATURATION: 98 %

## 2023-08-30 DIAGNOSIS — E78.2 MIXED HYPERLIPIDEMIA: ICD-10-CM

## 2023-08-30 DIAGNOSIS — Z00.00 WELL ADULT EXAM: Primary | ICD-10-CM

## 2023-08-30 DIAGNOSIS — E11.9 TYPE 2 DIABETES MELLITUS WITHOUT COMPLICATION, WITHOUT LONG-TERM CURRENT USE OF INSULIN (HCC): ICD-10-CM

## 2023-08-30 DIAGNOSIS — I10 ESSENTIAL HYPERTENSION: ICD-10-CM

## 2023-08-30 PROCEDURE — 99396 PREV VISIT EST AGE 40-64: CPT | Performed by: INTERNAL MEDICINE

## 2023-08-30 PROCEDURE — 88175 CYTOPATH C/V AUTO FLUID REDO: CPT | Performed by: INTERNAL MEDICINE

## 2023-08-30 PROCEDURE — 3008F BODY MASS INDEX DOCD: CPT | Performed by: INTERNAL MEDICINE

## 2023-08-30 PROCEDURE — 3078F DIAST BP <80 MM HG: CPT | Performed by: INTERNAL MEDICINE

## 2023-08-30 PROCEDURE — 3074F SYST BP LT 130 MM HG: CPT | Performed by: INTERNAL MEDICINE

## 2023-08-30 RX ORDER — LOSARTAN POTASSIUM 50 MG/1
50 TABLET ORAL DAILY
COMMUNITY

## 2023-09-05 LAB — HPV I/H RISK 1 DNA SPEC QL NAA+PROBE: NEGATIVE

## 2023-09-07 ENCOUNTER — TELEPHONE (OUTPATIENT)
Dept: RHEUMATOLOGY | Facility: CLINIC | Age: 58
End: 2023-09-07

## 2023-09-07 NOTE — TELEPHONE ENCOUNTER
Sachin Conrad,    That result is at the very bottom of the labs from the 16th and it is Negative. It's the DNA Antibody one? Because it is [facility] it is named slightly different then [facility]. Is this the lab you were referring to?

## 2023-09-07 NOTE — TELEPHONE ENCOUNTER
Called pt. Discussed results in detail. TRISHA equivocal. dsDNA still pending, will have staff call [facility] to check on status. UA showed trace protein and WBCs. Pt states she had some dysuria once but denies current UTI like symptoms. Recommended she follow up with PCP and get updated UA to be sure no infection. I will reach out once I get dsDNA results. Otherwise, remainder of labs negative, pointing away from active lupus or lupus dx. If dsDNA still positive, will have her follow up in 6 months, otherwise annual visit okay as long as pt keeps me updated on symptoms. Patient verbalized understanding of above instructions. No further questions at this time. Comfortable with this plan. Sarah Pierre, DO  [facility]  9/7/2023      Labs received from [facility]  08/2023  CMP grossly normal  CBC grossly normal  UA trace protein, 3+ leuk esterase; 20-40 WBC; no bacteria; no blood  Urine prot 122  Urine prot:cr ratio 0.122 normal  Thyroglobulin ab negative   TPO ab negative   ESR 2 normal  Vit D 34 normal  TRISHA 1:40 and 1:160 nuclear, speckled, nucleolar   Chromatin, SM, SM/RNP, RNP, SSA, SSB, SCL70, JO1, centromere negative  C3 107 normal  C4 16 normal  Cardiolipin IgA, IgG, IgM negative  Beta 2 glycoprotein IgA, IgG, IgM negative  RF IgA, IgG, IgM negative  CCP negative  14. 3.3 ETA protein negative

## 2023-09-27 ENCOUNTER — TELEPHONE (OUTPATIENT)
Dept: FAMILY MEDICINE CLINIC | Facility: CLINIC | Age: 58
End: 2023-09-27

## 2023-10-06 ENCOUNTER — HOSPITAL ENCOUNTER (OUTPATIENT)
Dept: MAMMOGRAPHY | Age: 58
Discharge: HOME | End: 2023-10-06
Attending: INTERNAL MEDICINE
Payer: COMMERCIAL

## 2023-10-06 DIAGNOSIS — Z00.00 WELL ADULT EXAM: ICD-10-CM

## 2023-10-06 PROCEDURE — 77063 BREAST TOMOSYNTHESIS BI: CPT | Performed by: INTERNAL MEDICINE

## 2023-10-06 PROCEDURE — 77067 SCR MAMMO BI INCL CAD: CPT | Performed by: INTERNAL MEDICINE

## 2023-10-26 ENCOUNTER — TELEPHONE (OUTPATIENT)
Dept: FAMILY MEDICINE CLINIC | Facility: CLINIC | Age: 58
End: 2023-10-26

## 2023-10-26 DIAGNOSIS — R82.90 ABNORMAL URINE: Primary | ICD-10-CM

## 2023-10-26 DIAGNOSIS — E11.9 TYPE 2 DIABETES MELLITUS WITHOUT COMPLICATION, WITHOUT LONG-TERM CURRENT USE OF INSULIN (HCC): ICD-10-CM

## 2023-10-26 NOTE — TELEPHONE ENCOUNTER
Pt wanted to discuss lab orders that are in my chart. She wanted to discuss if she should get the latest covid vaccine.

## 2023-10-26 NOTE — TELEPHONE ENCOUNTER
Patient said that she needs a Hgba1c. She said she has been feeling slightly nauseated and has a headache. She said that when she had a U/A done with Dr Romayne Grip it was abnormal. She asked if she should have a repeat U/A done. Should she get the latest Covid booster?

## 2023-10-28 LAB
APPEARANCE: CLEAR
BILIRUBIN: NEGATIVE
COLOR: YELLOW
GLUCOSE: NEGATIVE
HEMOGLOBIN A1C: 6.8 % OF TOTAL HGB
KETONES: NEGATIVE
LEUKOCYTE ESTERASE: NEGATIVE
NITRITE: NEGATIVE
OCCULT BLOOD: NEGATIVE
PH: 7.5 (ref 5–8)
PROTEIN: NEGATIVE
SPECIFIC GRAVITY: 1.01 (ref 1–1.03)

## 2023-10-28 RX ORDER — CHLORTHALIDONE 25 MG/1
25 TABLET ORAL DAILY
Qty: 90 TABLET | Refills: 0 | Status: SHIPPED | OUTPATIENT
Start: 2023-10-28

## 2023-10-28 NOTE — TELEPHONE ENCOUNTER
Chlorthalidone 25 mg oral tab      Hypertension Medications Protocol Cixxvm37/27/2023 02:58 PM   Protocol Details CMP or BMP in past 12 months    Last serum creatinine< 2.0    Appointment in past 6 or next 3 months        Last office visit:  8/30/23   No future appointments.   Last filled:  7/25/23  #90 with 0 refills   Last labs:  9/11/23  Crea: 0.62

## 2023-10-30 DIAGNOSIS — Z78.0 POSTMENOPAUSAL: Primary | ICD-10-CM

## 2023-10-31 RX ORDER — SEMAGLUTIDE 1.34 MG/ML
1 INJECTION, SOLUTION SUBCUTANEOUS WEEKLY
Qty: 3 EACH | Refills: 1 | Status: SHIPPED | OUTPATIENT
Start: 2023-10-31 | End: 2024-04-28

## 2023-11-03 RX ORDER — AMLODIPINE BESYLATE 5 MG/1
5 TABLET ORAL DAILY
Qty: 90 TABLET | Refills: 1 | Status: SHIPPED | OUTPATIENT
Start: 2023-11-03

## 2023-11-03 NOTE — TELEPHONE ENCOUNTER
OV 08/30   REFILL 08/02 #90  LABS 08/16    Requested Prescriptions     Pending Prescriptions Disp Refills    AMLODIPINE 5 MG Oral Tab [Pharmacy Med Name: AMLODIPINE BESYLATE 5 MG TAB] 90 tablet 0     Sig: TAKE 1 TABLET (5 MG TOTAL) BY MOUTH DAILY. No future appointments.

## 2023-12-05 ENCOUNTER — TELEPHONE (OUTPATIENT)
Dept: FAMILY MEDICINE CLINIC | Facility: CLINIC | Age: 58
End: 2023-12-05

## 2023-12-05 RX ORDER — SEMAGLUTIDE 1.34 MG/ML
1 INJECTION, SOLUTION SUBCUTANEOUS WEEKLY
Qty: 9 ML | Refills: 1 | Status: SHIPPED | OUTPATIENT
Start: 2023-12-05 | End: 2024-06-02

## 2023-12-05 NOTE — TELEPHONE ENCOUNTER
OZEMPIC   SHE IS ASKING FOR 3 MONTHS BECAUSE IT WILL BE CHEAPER FOR HER.    [facility] [place] [facility]

## 2023-12-05 NOTE — TELEPHONE ENCOUNTER
Spoke with pt - only received 1 pen for last refill. Has gotten 3 pens in the past.  States \"more cost-effective to get 3 pens\"  Contacted [facility] - was unsure what \"3 each\" meant on previous prescription. Per pharmacist to order 9ml.   New prescription sent

## 2023-12-06 ENCOUNTER — HOSPITAL ENCOUNTER (OUTPATIENT)
Dept: BONE DENSITY | Age: 58
Discharge: HOME | End: 2023-12-06
Attending: INTERNAL MEDICINE
Payer: COMMERCIAL

## 2023-12-06 DIAGNOSIS — Z78.0 POSTMENOPAUSAL: ICD-10-CM

## 2023-12-06 PROCEDURE — 77080 DXA BONE DENSITY AXIAL: CPT | Performed by: INTERNAL MEDICINE

## 2023-12-14 RX ORDER — ZOLPIDEM TARTRATE 10 MG/1
10 TABLET ORAL NIGHTLY PRN
Qty: 90 TABLET | Refills: 0 | Status: SHIPPED | OUTPATIENT
Start: 2023-12-14

## 2023-12-14 NOTE — TELEPHONE ENCOUNTER
Last refill: 08/02/23  qtY: 90  W/ 0 refills  Last ov: 08/30/23    Requested Prescriptions     Pending Prescriptions Disp Refills    ZOLPIDEM 10 MG Oral Tab [Pharmacy Med Name: ZOLPIDEM TARTRATE 10 MG TABLET] 90 tablet 0     Sig: TAKE 1 TABLET BY MOUTH EVERY DAY NIGHTLY AS NEEDED     No future appointments.

## 2023-12-26 RX ORDER — ATORVASTATIN CALCIUM 40 MG/1
TABLET, FILM COATED ORAL
Qty: 90 TABLET | Refills: 1 | Status: SHIPPED | OUTPATIENT
Start: 2023-12-26

## 2023-12-26 NOTE — TELEPHONE ENCOUNTER
Last refill: 06/21/23  Qty: 90  W/ 1 refills  Last ov 08/30/23    Requested Prescriptions     Pending Prescriptions Disp Refills    ATORVASTATIN 40 MG Oral Tab [Pharmacy Med Name: ATORVASTATIN 40 MG TABLET] 90 tablet 1     Sig: TAKE 1 TABLET BY MOUTH EVERY DAY AT NIGHT     No future appointments.

## 2024-01-27 RX ORDER — CHLORTHALIDONE 25 MG/1
25 TABLET ORAL DAILY
Qty: 90 TABLET | Refills: 0 | Status: SHIPPED | OUTPATIENT
Start: 2024-01-27

## 2024-01-27 NOTE — TELEPHONE ENCOUNTER
OV 08/30/23  LABS 08/16/23    REFILL 10/28 #90    No future appointments. MCM SENT - WILL NEED F/U IN 1MO

## 2024-02-22 NOTE — TELEPHONE ENCOUNTER
[facility] called.   Pt needs diabetes education referral faxed to:  [phone] Send Procedure Quote As Charge: No Detail Level: Detailed

## 2024-02-23 DIAGNOSIS — E11.9 TYPE 2 DIABETES MELLITUS WITHOUT COMPLICATION, WITHOUT LONG-TERM CURRENT USE OF INSULIN (HCC): Primary | ICD-10-CM

## 2024-02-23 RX ORDER — ESTRADIOL 10 UG/1
TABLET VAGINAL
Qty: 24 TABLET | Refills: 1 | Status: SHIPPED | OUTPATIENT
Start: 2024-02-23

## 2024-02-23 NOTE — TELEPHONE ENCOUNTER
OV 08/2023  LABS PAP 09/2023    REFILL 08/24/23 #24 +1 RF    Future Appointments   Date Time Provider Department Center   3/14/2024  9:45 AM Nola Villaseñor MD [facility] [place]

## 2024-03-06 ENCOUNTER — TELEPHONE (OUTPATIENT)
Dept: FAMILY MEDICINE CLINIC | Facility: CLINIC | Age: 59
End: 2024-03-06

## 2024-03-12 RX ORDER — GLIMEPIRIDE 2 MG/1
2 TABLET ORAL
Qty: 90 TABLET | Refills: 3 | Status: SHIPPED | OUTPATIENT
Start: 2024-03-12

## 2024-03-12 RX ORDER — POTASSIUM CHLORIDE 750 MG/1
10 TABLET, FILM COATED, EXTENDED RELEASE ORAL DAILY
Qty: 90 TABLET | Refills: 3 | Status: SHIPPED | OUTPATIENT
Start: 2024-03-12

## 2024-03-12 NOTE — TELEPHONE ENCOUNTER
Last office visit:8/30/23   Future Appointments   Date Time Provider Department Center   3/14/2024  9:45 AM Nola Villaseñor MD [facility] [place]   Last filled: Glimepiride: 6/22/23 Rx placed historically 6/22/23   Potassium Chloride: 6/22/23 Rx placed Hystorically   Last labs: 8/16/23     Protocol: Glimepiride    Diabetes Medication Protocol Maheed3603/12/2024 12:39 AM   Protocol Details Last A1C < 7.5 and within past 6 months    In person appointment or virtual visit in the past 6 mos or appointment in next 3 mos    Microalbumin procedure in past 12 months or taking ACE/ARB    EGFRCR or GFRNAA > 50    GFR in the past 12 months

## 2024-03-14 ENCOUNTER — OFFICE VISIT (OUTPATIENT)
Dept: FAMILY MEDICINE CLINIC | Facility: CLINIC | Age: 59
End: 2024-03-14
Payer: COMMERCIAL

## 2024-03-14 VITALS
OXYGEN SATURATION: 98 % | RESPIRATION RATE: 16 BRPM | DIASTOLIC BLOOD PRESSURE: 80 MMHG | TEMPERATURE: 98 F | WEIGHT: 180.5 LBS | SYSTOLIC BLOOD PRESSURE: 122 MMHG | HEART RATE: 81 BPM | BODY MASS INDEX: 30 KG/M2

## 2024-03-14 DIAGNOSIS — R53.82 CHRONIC FATIGUE: Primary | ICD-10-CM

## 2024-03-14 DIAGNOSIS — R45.89 ANXIETY ABOUT DYING: ICD-10-CM

## 2024-03-14 PROCEDURE — 99214 OFFICE O/P EST MOD 30 MIN: CPT | Performed by: INTERNAL MEDICINE

## 2024-03-14 RX ORDER — LOSARTAN POTASSIUM 50 MG/1
50 TABLET ORAL DAILY
Qty: 90 TABLET | Refills: 2 | Status: SHIPPED | OUTPATIENT
Start: 2024-03-14 | End: 2024-12-09

## 2024-03-14 RX ORDER — ALPRAZOLAM 0.25 MG/1
0.25 TABLET ORAL NIGHTLY PRN
Qty: 10 TABLET | Refills: 0 | Status: SHIPPED | OUTPATIENT
Start: 2024-03-14 | End: 2024-04-13

## 2024-03-24 DIAGNOSIS — E11.9 TYPE 2 DIABETES MELLITUS WITHOUT COMPLICATION, WITHOUT LONG-TERM CURRENT USE OF INSULIN (HCC): ICD-10-CM

## 2024-03-25 NOTE — TELEPHONE ENCOUNTER
Requested Prescriptions     Pending Prescriptions Disp Refills    METFORMIN HCL 1000 MG Oral Tab [Pharmacy Med Name: METFORMIN HCL 1,000 MG TABLET] 180 tablet 3     Sig: TAKE 1 TABLET BY MOUTH TWICE A DAY WITH MEALS     Last refill 3/15/23 #180 x 3   LOV 3/14/24  No future appointments.  Refilled per protocol

## 2024-03-29 LAB
ALBUMIN/GLOBULIN RATIO: 1.9 (CALC) (ref 1–2.5)
ALBUMIN: 4.1 G/DL (ref 3.6–5.1)
ALKALINE PHOSPHATASE: 55 U/L (ref 37–153)
ALT: 11 U/L (ref 6–29)
AST: 15 U/L (ref 10–35)
BILIRUBIN, TOTAL: 0.5 MG/DL (ref 0.2–1.2)
BUN/CREATININE RATIO: 22 (CALC) (ref 6–22)
BUN: 11 MG/DL (ref 7–25)
CALCIUM: 9.2 MG/DL (ref 8.6–10.4)
CARBON DIOXIDE: 32 MMOL/L (ref 20–32)
CHLORIDE: 97 MMOL/L (ref 98–110)
CHOL/HDLC RATIO: 2.2 (CALC)
CHOLESTEROL, TOTAL: 130 MG/DL
CREATININE, RANDOM URINE: 127 MG/DL (ref 20–275)
CREATININE: 0.49 MG/DL (ref 0.5–1.05)
EGFR: 108 ML/MIN/1.73M2
GLOBULIN: 2.2 G/DL (CALC) (ref 1.9–3.7)
GLUCOSE: 136 MG/DL (ref 65–99)
HDL CHOLESTEROL: 58 MG/DL
HEMOGLOBIN A1C: 7.8 % OF TOTAL HGB
LDL-CHOLESTEROL: 56 MG/DL (CALC)
MICROALBUMIN/CREATININE RATIO, RANDOM URINE: 19 MG/G CREAT
MICROALBUMIN: 2.4 MG/DL
NON-HDL CHOLESTEROL: 72 MG/DL (CALC)
POTASSIUM: 3.4 MMOL/L (ref 3.5–5.3)
PROTEIN, TOTAL: 6.3 G/DL (ref 6.1–8.1)
SODIUM: 137 MMOL/L (ref 135–146)
TRIGLYCERIDES: 80 MG/DL
VITAMIN B12: 371 PG/ML (ref 200–1100)

## 2024-04-03 ENCOUNTER — TELEPHONE (OUTPATIENT)
Dept: FAMILY MEDICINE CLINIC | Facility: CLINIC | Age: 59
End: 2024-04-03

## 2024-04-03 NOTE — TELEPHONE ENCOUNTER
Pt was in 2 weeks ago and mentioned pain in upper back and neck, she thought it would go away but it hasn't she is wanting to know if there is something she can do about it.

## 2024-04-04 RX ORDER — CYCLOBENZAPRINE HCL 5 MG
5 TABLET ORAL NIGHTLY
Qty: 30 TABLET | Refills: 0 | Status: SHIPPED | OUTPATIENT
Start: 2024-04-04 | End: 2024-05-04

## 2024-04-10 NOTE — TELEPHONE ENCOUNTER
I spoke to the patient regarding her breast MRI I reviewed the findings of her MRI and recommended additional imaging, right diagnostic ultrasound to interrogate the area of 4 mm enhancement seen on her breast imaging.  We will follow-up this imaging the patient will likely return to clinic after it is complete for reevaluation of her nipple discharge.  All questions answered and she agreed with the plan of care   Let her know I put an eye exam in for her and she should schedule at Issue.

## 2024-04-15 RX ORDER — METOPROLOL SUCCINATE 50 MG/1
50 TABLET, EXTENDED RELEASE ORAL DAILY
Qty: 90 TABLET | Refills: 3 | Status: SHIPPED | OUTPATIENT
Start: 2024-04-15

## 2024-04-15 NOTE — TELEPHONE ENCOUNTER
Last refill: 03/15/23  Qty: 90  W/ 3 refills  Last ov: 03/14/24    Requested Prescriptions     Pending Prescriptions Disp Refills    METOPROLOL SUCCINATE ER 50 MG Oral Tablet 24 Hr [Pharmacy Med Name: METOPROLOL SUCC ER 50 MG TAB] 90 tablet 3     Sig: TAKE 1 TABLET BY MOUTH EVERY DAY     Future Appointments   Date Time Provider Department Center   5/8/2024  9:30 AM Nola Villaseñor MD [facility] [place]

## 2024-04-27 RX ORDER — CHLORTHALIDONE 25 MG/1
25 TABLET ORAL DAILY
Qty: 90 TABLET | Refills: 0 | Status: SHIPPED | OUTPATIENT
Start: 2024-04-27

## 2024-04-27 NOTE — TELEPHONE ENCOUNTER
OV 03/14/24  LABS 03/24     REFILL 01/27/24 #90    Future Appointments   Date Time Provider Department Center   5/8/2024  9:30 AM Nola Villaseñor MD [facility] [place]

## 2024-04-29 RX ORDER — AMLODIPINE BESYLATE 5 MG/1
5 TABLET ORAL DAILY
Qty: 90 TABLET | Refills: 1 | Status: SHIPPED | OUTPATIENT
Start: 2024-04-29

## 2024-04-29 RX ORDER — ESCITALOPRAM OXALATE 20 MG/1
20 TABLET ORAL DAILY
Qty: 90 TABLET | Refills: 3 | Status: SHIPPED | OUTPATIENT
Start: 2024-04-29

## 2024-04-29 NOTE — TELEPHONE ENCOUNTER
OV 03/24  LABS 03/24    REFILL  -Escitalopram 03/15/23 #90 +3 RF  -Amlodipine 11/23 #90 +1 RF    Future Appointments   Date Time Provider Department Center   5/8/2024  9:30 AM Nola Villaseñor MD [facility] [place]

## 2024-05-08 ENCOUNTER — OFFICE VISIT (OUTPATIENT)
Dept: FAMILY MEDICINE CLINIC | Facility: CLINIC | Age: 59
End: 2024-05-08
Payer: COMMERCIAL

## 2024-05-08 VITALS
DIASTOLIC BLOOD PRESSURE: 76 MMHG | SYSTOLIC BLOOD PRESSURE: 124 MMHG | RESPIRATION RATE: 16 BRPM | OXYGEN SATURATION: 98 % | TEMPERATURE: 98 F | HEART RATE: 80 BPM | BODY MASS INDEX: 30 KG/M2 | WEIGHT: 178.25 LBS

## 2024-05-08 DIAGNOSIS — E11.9 TYPE 2 DIABETES MELLITUS WITHOUT COMPLICATION, WITHOUT LONG-TERM CURRENT USE OF INSULIN (HCC): Primary | ICD-10-CM

## 2024-05-08 DIAGNOSIS — I10 ESSENTIAL HYPERTENSION: ICD-10-CM

## 2024-05-08 DIAGNOSIS — E78.2 MIXED HYPERLIPIDEMIA: ICD-10-CM

## 2024-05-08 DIAGNOSIS — M54.2 NECK PAIN: ICD-10-CM

## 2024-05-08 PROCEDURE — 99214 OFFICE O/P EST MOD 30 MIN: CPT | Performed by: INTERNAL MEDICINE

## 2024-05-08 RX ORDER — CYCLOBENZAPRINE HCL 5 MG
5 TABLET ORAL NIGHTLY
Qty: 30 TABLET | Refills: 0 | Status: SHIPPED | OUTPATIENT
Start: 2024-05-08 | End: 2024-06-07

## 2024-05-08 RX ORDER — CHLORTHALIDONE 25 MG/1
25 TABLET ORAL DAILY
Qty: 90 TABLET | Refills: 0 | Status: SHIPPED | OUTPATIENT
Start: 2024-05-08

## 2024-05-08 RX ORDER — AMLODIPINE BESYLATE 5 MG/1
5 TABLET ORAL DAILY
Qty: 90 TABLET | Refills: 1 | Status: SHIPPED | OUTPATIENT
Start: 2024-05-08

## 2024-05-08 RX ORDER — SEMAGLUTIDE 1.34 MG/ML
1 INJECTION, SOLUTION SUBCUTANEOUS WEEKLY
Qty: 9 ML | Refills: 1 | Status: SHIPPED | OUTPATIENT
Start: 2024-05-08 | End: 2024-11-04

## 2024-05-10 NOTE — PROGRESS NOTES
May Bowen is a 58 year old female.  HPI:   Pt has pain in the upper back and neck worse for the last 2 weeks. No precipitating factors, her sisters   and this has been very stressful.   Current Outpatient Medications   Medication Sig Dispense Refill    cyclobenzaprine 5 MG Oral Tab Take 1 tablet (5 mg total) by mouth nightly. 30 tablet 0    amLODIPine 5 MG Oral Tab Take 1 tablet (5 mg total) by mouth daily. 90 tablet 1    chlorthalidone 25 MG Oral Tab Take 1 tablet (25 mg total) by mouth daily. 90 tablet 0    semaglutide (OZEMPIC, 1 MG/DOSE,) 4 MG/3ML Subcutaneous Solution Pen-injector Inject 1 mg into the skin once a week. 9 mL 1    escitalopram 20 MG Oral Tab Take 1 tablet (20 mg total) by mouth daily. 90 tablet 3    zolpidem 10 MG Oral Tab Take 1 tablet (10 mg total) by mouth nightly as needed. 90 tablet 0    metFORMIN HCl 1000 MG Oral Tab TAKE 1 TABLET BY MOUTH TWICE A DAY WITH MEALS 180 tablet 0    losartan 50 MG Oral Tab Take 1 tablet (50 mg total) by mouth daily. 90 tablet 2    Potassium Chloride ER 10 MEQ Oral Tab CR Take 1 tablet (10 mEq total) by mouth daily. 90 tablet 3    glimepiride 2 MG Oral Tab Take 1 tablet (2 mg total) by mouth daily with breakfast. 90 tablet 3    YUVAFEM 10 MCG Vaginal Tab INSERT 1 TABLET VAGINALLY ONCE DAILY FOR 1 WEEK, THEN 2 TIMES PER WEEK 24 tablet 1    ATORVASTATIN 40 MG Oral Tab TAKE 1 TABLET BY MOUTH EVERY DAY AT NIGHT 90 tablet 1    METOPROLOL SUCCINATE ER 50 MG Oral Tablet 24 Hr TAKE 1 TABLET BY MOUTH EVERY DAY 90 tablet 3      Past Medical History:    Diabetes (HCC)    Essential hypertension    Hyperlipidemia      Social History:  Social History     Socioeconomic History    Marital status:    Tobacco Use    Smoking status: Never    Smokeless tobacco: Never   Vaping Use    Vaping status: Never Used   Substance and Sexual Activity    Alcohol use: Yes     Comment: occasional    Drug use: Never        REVIEW OF SYSTEMS:   GENERAL HEALTH: feels  well otherwise  SKIN: denies any unusual skin lesions or rashes  RESPIRATORY: denies shortness of breath with exertion  CARDIOVASCULAR: denies chest pain on exertion  GI: denies abdominal pain and denies heartburn  NEURO: denies headaches    EXAM:   /76   Pulse 80   Temp 98.3 °F (36.8 °C) (Temporal)   Resp 16   Wt 178 lb 4 oz (80.9 kg)   SpO2 98%   BMI 29.66 kg/m²   GENERAL: well developed, well nourished,in no apparent distress  SKIN: no rashes,no suspicious lesions  HEENT: atraumatic, normocephalic,ears and throat are clear  NECK: supple,no adenopathy,no bruits  LUNGS: clear to auscultation  CARDIO: RRR without murmur  GI: good BS's,no masses, HSM or tenderness  EXTREMITIES: no cyanosis, clubbing or edema    ASSESSMENT AND PLAN:     Encounter Diagnoses   Name Primary?    Type 2 diabetes mellitus without complication, without long-term current use of insulin (HCC) Yes    Essential hypertension     Mixed hyperlipidemia     Neck pain      Muscle relaxor and PT after you return from trip to [facility].   No orders of the defined types were placed in this encounter.      Meds & Refills for this Visit:  Requested Prescriptions     Signed Prescriptions Disp Refills    cyclobenzaprine 5 MG Oral Tab 30 tablet 0     Sig: Take 1 tablet (5 mg total) by mouth nightly.    amLODIPine 5 MG Oral Tab 90 tablet 1     Sig: Take 1 tablet (5 mg total) by mouth daily.    chlorthalidone 25 MG Oral Tab 90 tablet 0     Sig: Take 1 tablet (25 mg total) by mouth daily.    semaglutide (OZEMPIC, 1 MG/DOSE,) 4 MG/3ML Subcutaneous Solution Pen-injector 9 mL 1     Sig: Inject 1 mg into the skin once a week.       Imaging & Consults:  OP REFERRAL TO [facility]    Follow up as needed   The patient indicates understanding of these issues and agrees to the plan.

## 2024-06-22 DIAGNOSIS — E11.9 TYPE 2 DIABETES MELLITUS WITHOUT COMPLICATION, WITHOUT LONG-TERM CURRENT USE OF INSULIN (HCC): ICD-10-CM

## 2024-06-22 DIAGNOSIS — E78.2 MIXED HYPERLIPIDEMIA: Primary | ICD-10-CM

## 2024-06-22 RX ORDER — ATORVASTATIN CALCIUM 40 MG/1
TABLET, FILM COATED ORAL
Qty: 90 TABLET | Refills: 1 | Status: SHIPPED | OUTPATIENT
Start: 2024-06-22

## 2024-06-22 NOTE — TELEPHONE ENCOUNTER
Name from pharmacy: METFORMIN HCL 1,000 MG TABLET         Will file in chart as: METFORMIN HCL 1000 MG Oral Tab    Sig: TAKE 1 TABLET BY MOUTH TWICE A DAY WITH FOOD    Disp: 180 tablet    Refills: 0 (Pharmacy requested: Not specified)    Start: 6/22/2024    Class: Normal    Non-formulary For: Type 2 diabetes mellitus without complication, without long-term current use of insulin (HCC)    Last ordered: 2 months ago (3/25/2024) by Nola Villaseñor MD    Last refill: 3/25/2024    Rx #: [id number]    Diabetes Medication Protocol Fthlok5106/22/2024 07:22 AM   Protocol Details Last A1C < 7.5 and within past 6 months    In person appointment or virtual visit in the past 6 mos or appointment in next 3 mos    Microalbumin procedure in past 12 months or taking ACE/ARB    EGFRCR or GFRNAA > 50    GFR in the past 12 months       Name from pharmacy: ATORVASTATIN 40 MG TABLET         Will file in chart as: ATORVASTATIN 40 MG Oral Tab    Sig: TAKE 1 TABLET BY MOUTH EVERY DAY AT NIGHT    Disp: 90 tablet    Refills: 1    Start: 6/22/2024    Class: Normal    Last ordered: 5 months ago (12/26/2023) by Nola Villaseñor MD    Last refill: 3/25/2024    Rx #: [id number]    Cholesterol Medication Protocol Imsmpm9106/22/2024 07:22 AM   Protocol Details ALT < 80    ALT resulted within past year    Lipid panel within past 12 months    In person appointment or virtual visit in the past 12 mos or appointment in next 3 mos      Last refill   metformin 3/25/24 #180 0ref      Atorvastatin 12/26/23 #90 1 ref  LOV 5/8/24  Last labs 3/28/24  A1C 7.8  No future appointments.

## 2024-09-27 DIAGNOSIS — E11.9 TYPE 2 DIABETES MELLITUS WITHOUT COMPLICATION, WITHOUT LONG-TERM CURRENT USE OF INSULIN (HCC): ICD-10-CM

## 2024-10-03 DIAGNOSIS — F51.01 PRIMARY INSOMNIA: ICD-10-CM

## 2024-10-03 RX ORDER — ZOLPIDEM TARTRATE 10 MG/1
10 TABLET ORAL NIGHTLY PRN
Qty: 90 TABLET | Refills: 0 | Status: SHIPPED | OUTPATIENT
Start: 2024-10-03

## 2024-10-31 ENCOUNTER — TELEPHONE (OUTPATIENT)
Dept: FAMILY MEDICINE CLINIC | Facility: CLINIC | Age: 59
End: 2024-10-31

## 2024-10-31 DIAGNOSIS — E78.2 MIXED HYPERLIPIDEMIA: ICD-10-CM

## 2024-10-31 DIAGNOSIS — I10 ESSENTIAL HYPERTENSION: ICD-10-CM

## 2024-10-31 DIAGNOSIS — Z12.31 SCREENING MAMMOGRAM FOR BREAST CANCER: Primary | ICD-10-CM

## 2024-10-31 DIAGNOSIS — E11.9 TYPE 2 DIABETES MELLITUS WITHOUT COMPLICATION, WITHOUT LONG-TERM CURRENT USE OF INSULIN (HCC): ICD-10-CM

## 2024-10-31 RX ORDER — CHLORTHALIDONE 25 MG/1
25 TABLET ORAL DAILY
Qty: 90 TABLET | Refills: 0 | Status: SHIPPED | OUTPATIENT
Start: 2024-10-31

## 2024-10-31 NOTE — TELEPHONE ENCOUNTER
CHLORTHALIDONE 25 MG Oral Tab     Hypertension Medications Protocol Zhnjon91/31/2024 12:40 AM   Protocol Details CMP or BMP in past 12 months    Last BP reading less than 140/90    In person appointment or virtual visit in the past 12 mos or appointment in next 3 mos    EGFRCR or GFRNAA > 50      Last office visit: 5/8/24     No future appointments.  Last filled: 5/8/24 #90    Last labs: 3/28/24  eGFR-Cr: 108  Last BP:  124/76      Called May and set up a physical with Dr Villaseñor could not come in prior to this.  Future Appointments   Date Time Provider Department Center   12/2/2024  9:45 AM Nola Villaseñor MD [facility] [place]

## 2024-10-31 NOTE — TELEPHONE ENCOUNTER
Patient advised. Lab orders faxed to [facility] in [place]. She will schedule the mammogram through [facility].

## 2024-10-31 NOTE — TELEPHONE ENCOUNTER
Called and Spoke with May and set up a physical with her while on the phone with her she said that she went to [facility] yesterday and we should be getting her diabetic eye exam from them and she asked if we could place an order for her mammogram so she can get this done prior to her appointment with Dr. Villaseñor.  I said that I didn't think that she would need labs done since she just had them done on 3/28/24 but if Dr. Villaseñor wanted her to have some that when we sent the [employer] message we would let her know.  Please send a [employer] message when mammo is ordered and if labs need to be done.  Thank you.

## 2024-11-27 LAB
ABSOLUTE BASOPHILS: 73 CELLS/UL (ref 0–200)
ABSOLUTE EOSINOPHILS: 332 CELLS/UL (ref 15–500)
ABSOLUTE LYMPHOCYTES: 2009 CELLS/UL (ref 850–3900)
ABSOLUTE MONOCYTES: 664 CELLS/UL (ref 200–950)
ABSOLUTE NEUTROPHILS: 5022 CELLS/UL (ref 1500–7800)
ALBUMIN/GLOBULIN RATIO: 1.7 (CALC) (ref 1–2.5)
ALBUMIN: 4.1 G/DL (ref 3.6–5.1)
ALKALINE PHOSPHATASE: 63 U/L (ref 37–153)
ALT: 11 U/L (ref 6–29)
AST: 15 U/L (ref 10–35)
BASOPHILS: 0.9 %
BILIRUBIN, TOTAL: 0.5 MG/DL (ref 0.2–1.2)
BUN: 12 MG/DL (ref 7–25)
CALCIUM: 9.8 MG/DL (ref 8.6–10.4)
CARBON DIOXIDE: 34 MMOL/L (ref 20–32)
CHLORIDE: 96 MMOL/L (ref 98–110)
CREATININE: 0.62 MG/DL (ref 0.5–1.05)
EGFR: 101 ML/MIN/1.73M2
EOSINOPHILS: 4.1 %
GLOBULIN: 2.4 G/DL (CALC) (ref 1.9–3.7)
GLUCOSE: 246 MG/DL (ref 65–99)
HEMATOCRIT: 42.4 % (ref 35–45)
HEMOGLOBIN A1C: 10.7 % OF TOTAL HGB
HEMOGLOBIN: 14.1 G/DL (ref 11.7–15.5)
LYMPHOCYTES: 24.8 %
MCH: 28.2 PG (ref 27–33)
MCHC: 33.3 G/DL (ref 32–36)
MCV: 84.8 FL (ref 80–100)
MONOCYTES: 8.2 %
MPV: 12.4 FL (ref 7.5–12.5)
NEUTROPHILS: 62 %
PLATELET COUNT: 250 THOUSAND/UL (ref 140–400)
POTASSIUM: 4.2 MMOL/L (ref 3.5–5.3)
PROTEIN, TOTAL: 6.5 G/DL (ref 6.1–8.1)
RDW: 12.5 % (ref 11–15)
RED BLOOD CELL COUNT: 5 MILLION/UL (ref 3.8–5.1)
SODIUM: 138 MMOL/L (ref 135–146)
WHITE BLOOD CELL COUNT: 8.1 THOUSAND/UL (ref 3.8–10.8)

## 2024-12-02 ENCOUNTER — OFFICE VISIT (OUTPATIENT)
Dept: FAMILY MEDICINE CLINIC | Facility: CLINIC | Age: 59
End: 2024-12-02
Payer: COMMERCIAL

## 2024-12-02 VITALS
DIASTOLIC BLOOD PRESSURE: 74 MMHG | TEMPERATURE: 98 F | BODY MASS INDEX: 29.55 KG/M2 | WEIGHT: 177.38 LBS | HEART RATE: 86 BPM | OXYGEN SATURATION: 100 % | RESPIRATION RATE: 16 BRPM | SYSTOLIC BLOOD PRESSURE: 126 MMHG | HEIGHT: 65 IN

## 2024-12-02 DIAGNOSIS — I10 ESSENTIAL HYPERTENSION: ICD-10-CM

## 2024-12-02 DIAGNOSIS — E78.2 MIXED HYPERLIPIDEMIA: ICD-10-CM

## 2024-12-02 DIAGNOSIS — E11.9 TYPE 2 DIABETES MELLITUS WITHOUT COMPLICATION, WITHOUT LONG-TERM CURRENT USE OF INSULIN (HCC): ICD-10-CM

## 2024-12-02 DIAGNOSIS — Z23 NEED FOR VACCINATION: Primary | ICD-10-CM

## 2024-12-02 RX ORDER — ATORVASTATIN CALCIUM 40 MG/1
TABLET, FILM COATED ORAL
Qty: 90 TABLET | Refills: 1 | OUTPATIENT
Start: 2024-12-02

## 2024-12-02 RX ORDER — AMLODIPINE BESYLATE 5 MG/1
5 TABLET ORAL DAILY
Qty: 90 TABLET | Refills: 0 | Status: SHIPPED | OUTPATIENT
Start: 2024-12-02

## 2024-12-02 RX ORDER — METOPROLOL SUCCINATE 50 MG/1
50 TABLET, EXTENDED RELEASE ORAL DAILY
Qty: 90 TABLET | Refills: 3 | Status: SHIPPED | OUTPATIENT
Start: 2024-12-02

## 2024-12-02 RX ORDER — LOSARTAN POTASSIUM 50 MG/1
50 TABLET ORAL DAILY
Qty: 90 TABLET | Refills: 2 | OUTPATIENT
Start: 2024-12-02

## 2024-12-02 RX ORDER — GLIMEPIRIDE 2 MG/1
4 TABLET ORAL
Qty: 180 TABLET | Refills: 0 | Status: SHIPPED | OUTPATIENT
Start: 2024-12-02 | End: 2025-03-02

## 2024-12-02 RX ORDER — SEMAGLUTIDE 1.34 MG/ML
1 INJECTION, SOLUTION SUBCUTANEOUS WEEKLY
Qty: 9 ML | Refills: 1 | Status: SHIPPED | OUTPATIENT
Start: 2024-12-02 | End: 2025-05-31

## 2024-12-02 NOTE — PROGRESS NOTES
HPI:   May Bowen is a 59 year old female who presents for a complete physical exam. Symptoms: denies discharge, itching, burning or dysuria, is menopausal. Patient complains of high blood sugars. She takes Ambien every night to help with sleep.  She has had 6 deaths this year in her family and feels like maybe her change in mood has contributed to some bad food choices. No weight gain, no exercise.     Immunization History   Administered Date(s) Administered    Covid-19 Vaccine Moderna Bivalent 50mcg/0.5mL 12+ years 02/01/2023    Covid-19 Vaccine Pfizer 30 mcg/0.3 ml 03/22/2021, 04/12/2021, 11/16/2021    FLUZONE 6 months and older PFS 0.5 ml (78210) 10/04/2019, 10/26/2021, 11/02/2022    Flucelvax 0.5 Ml Quad PFS Single Dose 09/24/2020    Flucelvax Influenza vaccine, trivalent (ccIIV3), 0.5mL IM 11/08/2023    Influenza 09/24/2020, 10/19/2022, 11/08/2023    Moderna Covid-19 Vaccine 50mcg/0.5ml 12yrs+ 04/04/2024    Pneumococcal (Prevnar 13) 06/12/2020    TDAP 03/15/2023    Zoster Vaccine Recombinant Adjuvanted (Shingrix) 12/01/2021, 03/15/2023     Wt Readings from Last 6 Encounters:   12/02/24 177 lb 6 oz (80.5 kg)   05/08/24 178 lb 4 oz (80.9 kg)   03/14/24 180 lb 8 oz (81.9 kg)   08/30/23 178 lb (80.7 kg)   08/14/23 180 lb (81.6 kg)   03/15/23 177 lb 4 oz (80.4 kg)     Body mass index is 29.52 kg/m².     Lab Results   Component Value Date     (H) 11/26/2024     (H) 03/28/2024    GLU 97 08/16/2023     Lab Results   Component Value Date    CHOLEST 130 03/28/2024    CHOLEST 116 11/30/2022    CHOLEST 144 12/01/2021     Lab Results   Component Value Date    HDL 58 03/28/2024    HDL 45 (L) 11/30/2022    HDL 67 (H) 12/01/2021     Lab Results   Component Value Date    LDL 56 03/28/2024    LDL 53 11/30/2022    LDL 64 12/01/2021     Lab Results   Component Value Date    AST 15 11/26/2024    AST 15 03/28/2024    AST 14 08/16/2023     Lab Results   Component Value Date    ALT 11 11/26/2024    ALT 11  03/28/2024    ALT 10 08/16/2023       Current Outpatient Medications   Medication Sig Dispense Refill    CHLORTHALIDONE 25 MG Oral Tab TAKE 1 TABLET (25 MG TOTAL) BY MOUTH DAILY. 90 tablet 0    ZOLPIDEM 10 MG Oral Tab TAKE 1 TABLET BY MOUTH NIGHTLY AS NEEDED 90 tablet 0    METFORMIN HCL 1000 MG Oral Tab TAKE 1 TABLET BY MOUTH TWICE A DAY WITH FOOD 180 tablet 0    ATORVASTATIN 40 MG Oral Tab TAKE 1 TABLET BY MOUTH EVERY DAY AT NIGHT 90 tablet 1    amLODIPine 5 MG Oral Tab Take 1 tablet (5 mg total) by mouth daily. 90 tablet 1    semaglutide (OZEMPIC, 1 MG/DOSE,) 4 MG/3ML Subcutaneous Solution Pen-injector Inject 1 mg into the skin once a week. 9 mL 1    escitalopram 20 MG Oral Tab Take 1 tablet (20 mg total) by mouth daily. 90 tablet 3    METOPROLOL SUCCINATE ER 50 MG Oral Tablet 24 Hr TAKE 1 TABLET BY MOUTH EVERY DAY 90 tablet 3    losartan 50 MG Oral Tab Take 1 tablet (50 mg total) by mouth daily. 90 tablet 2    Potassium Chloride ER 10 MEQ Oral Tab CR Take 1 tablet (10 mEq total) by mouth daily. 90 tablet 3    glimepiride 2 MG Oral Tab Take 1 tablet (2 mg total) by mouth daily with breakfast. 90 tablet 3    YUVAFEM 10 MCG Vaginal Tab INSERT 1 TABLET VAGINALLY ONCE DAILY FOR 1 WEEK, THEN 2 TIMES PER WEEK 24 tablet 1      Past Medical History:    Diabetes (HCC)    Essential hypertension    Hyperlipidemia      Past Surgical History:   Procedure Laterality Date    Ablation      Appendectomy      Cyst aspiration right      15+ yrs ago    Repair ing hernia,5+y/o,reducibl        Family History   Problem Relation Age of Onset    Breast Cancer Mother 52      Social History:   Social History     Socioeconomic History    Marital status:    Tobacco Use    Smoking status: Never    Smokeless tobacco: Never   Vaping Use    Vaping status: Never Used   Substance and Sexual Activity    Alcohol use: Yes     Comment: occasional    Drug use: Never     Occ: retired. : yes. Children: yes.   Exercise: none.  Diet: doesn't  watch     REVIEW OF SYSTEMS:   GENERAL: feels well otherwise  SKIN: denies any unusual skin lesions  EYES:denies blurred vision or double vision  HEENT: denies nasal congestion, sinus pain or ST  LUNGS: denies shortness of breath with exertion  CARDIOVASCULAR: denies chest pain on exertion  GI: denies abdominal pain,denies heartburn  : denies dysuria, vaginal discharge or itching,periods regular   MUSCULOSKELETAL: denies back pain  NEURO: denies headaches  PSYCHE: denies depression or anxiety  HEMATOLOGIC: denies hx of anemia  ENDOCRINE: denies thyroid history  ALL/ASTHMA: denies hx of allergy or asthma    EXAM:   /74   Pulse 86   Temp 97.9 °F (36.6 °C) (Temporal)   Resp 16   Ht 5' 5\" (1.651 m)   Wt 177 lb 6 oz (80.5 kg)   SpO2 100%   BMI 29.52 kg/m²   Body mass index is 29.52 kg/m².   GENERAL: well developed, well nourished,in no apparent distress  SKIN: no rashes,no suspicious lesions  HEENT: atraumatic, normocephalic,ears and throat are clear  EYES:PERRLA, EOMI, normal optic disk,conjunctiva are clear  NECK: supple,no adenopathy,no bruits  CHEST: no chest tenderness  BREAST: deferred  LUNGS: clear to auscultation  CARDIO: RRR without murmur  GI: good BS's,no masses, HSM or tenderness  :deferred  RECTAL:deferred  MUSCULOSKELETAL: back is not tender,FROM of the back  EXTREMITIES: no cyanosis, clubbing or edema  NEURO: Oriented times three,cranial nerves are intact,motor and sensory are grossly intact    ASSESSMENT AND PLAN:   May Bowen is a 59 year old female who presents for a complete physical exam.   Pt' s weight is Body mass index is 29.52 kg/m²., recommended low fat diet and aerobic exercise 30 minutes three times weekly.  The patient indicates understanding of these issues and agrees to the plan.  The patient is asked to return for CPX in 1 year.

## 2024-12-02 NOTE — TELEPHONE ENCOUNTER
OV 12/2/24  LABS 11/27/24 comp, A1c 10.7  03/29/24 LIPIDS    REFILL  -Metformin 1000 mg 9/27/24 #180 - too soon  -Amlodipine 5 mg 05/08/24 #90 +1 RF  -Atorvastatin 40 mg 06/22/24 #90 +1 RF too soon  -Losartan 50 mg 03/14/24 #90 +2 RF too soon    No future appointments.    BP Readings from Last 3 Encounters:   12/02/24 126/74   05/08/24 124/76   03/14/24 122/80

## 2024-12-18 RX ORDER — LOSARTAN POTASSIUM 50 MG/1
50 TABLET ORAL DAILY
Qty: 90 TABLET | Refills: 1 | Status: SHIPPED | OUTPATIENT
Start: 2024-12-18

## 2024-12-18 NOTE — TELEPHONE ENCOUNTER
OV 12/02/24  LABS 11/27/24    REFILL 03/14/24 #90 +2 RF    No future appointments.    BP Readings from Last 3 Encounters:   12/02/24 126/74   05/08/24 124/76   03/14/24 122/80

## 2025-01-09 DIAGNOSIS — E78.2 MIXED HYPERLIPIDEMIA: ICD-10-CM

## 2025-01-09 RX ORDER — ATORVASTATIN CALCIUM 40 MG/1
TABLET, FILM COATED ORAL
Qty: 90 TABLET | Refills: 1 | Status: SHIPPED | OUTPATIENT
Start: 2025-01-09

## 2025-01-09 NOTE — TELEPHONE ENCOUNTER
Last refill: 06/22/24  qtY: 90  W/ 1 refills  Last ov: 12/02/24    Requested Prescriptions     Pending Prescriptions Disp Refills    ATORVASTATIN 40 MG Oral Tab [Pharmacy Med Name: ATORVASTATIN 40 MG TABLET] 90 tablet 1     Sig: TAKE 1 TABLET BY MOUTH EVERY DAY AT NIGHT     No future appointments.

## 2025-01-31 DIAGNOSIS — E11.9 TYPE 2 DIABETES MELLITUS WITHOUT COMPLICATION, WITHOUT LONG-TERM CURRENT USE OF INSULIN (HCC): ICD-10-CM

## 2025-01-31 NOTE — TELEPHONE ENCOUNTER
Medication: Metformin HCL 1000 mg Oral Tab     Last Refill: 9/27/24 by Nola Villaseñor MD  Qt: 180 tablet w/ 0 Rf    Times per day:  Take 1 tablet by mouth twice a day with food    Last ov: 12/2/24 11/26/24  HEMOGLOBIN A1c  <5.7 % of total Hgb 10.7 High      3/28/24   MICROALBUMIN  See Note: mg/dL 2.4     11/26/24  EGFR  > OR = 60 mL/min/1.73m2 101       Diabetes Medication Protocol Kvgfcw1801/31/2025 12:02 PM   Protocol Details Last A1C < 7.5 and within past 6 months    In person appointment or virtual visit in the past 6 mos or appointment in next 3 mos    Microalbumin procedure in past 12 months or taking ACE/ARB    EGFRCR or GFRNAA > 50    GFR in the past 12 months    Medication is active on med list

## 2025-02-01 DIAGNOSIS — E11.9 TYPE 2 DIABETES MELLITUS WITHOUT COMPLICATION, WITHOUT LONG-TERM CURRENT USE OF INSULIN (HCC): ICD-10-CM

## 2025-02-01 DIAGNOSIS — I10 ESSENTIAL HYPERTENSION: ICD-10-CM

## 2025-02-01 DIAGNOSIS — E78.2 MIXED HYPERLIPIDEMIA: ICD-10-CM

## 2025-02-01 NOTE — TELEPHONE ENCOUNTER
Last refill: 10/31/24  qtY: 90  W/ 0 refills  Last ov: 12/02/24    Requested Prescriptions     Pending Prescriptions Disp Refills    CHLORTHALIDONE 25 MG Oral Tab [Pharmacy Med Name: CHLORTHALIDONE 25 MG TABLET] 90 tablet 0     Sig: TAKE 1 TABLET (25 MG TOTAL) BY MOUTH DAILY.     No future appointments.

## 2025-02-02 RX ORDER — CHLORTHALIDONE 25 MG/1
25 TABLET ORAL DAILY
Qty: 90 TABLET | Refills: 0 | Status: SHIPPED | OUTPATIENT
Start: 2025-02-02

## 2025-02-13 ENCOUNTER — HOSPITAL ENCOUNTER (OUTPATIENT)
Dept: MAMMOGRAPHY | Age: 60
Discharge: HOME | End: 2025-02-13
Attending: INTERNAL MEDICINE
Payer: COMMERCIAL

## 2025-02-13 DIAGNOSIS — Z12.31 SCREENING MAMMOGRAM FOR BREAST CANCER: ICD-10-CM

## 2025-02-13 PROCEDURE — 77067 SCR MAMMO BI INCL CAD: CPT | Performed by: INTERNAL MEDICINE

## 2025-02-13 PROCEDURE — 77063 BREAST TOMOSYNTHESIS BI: CPT | Performed by: INTERNAL MEDICINE

## 2025-02-27 RX ORDER — GLIMEPIRIDE 2 MG/1
4 TABLET ORAL
Qty: 180 TABLET | Refills: 0 | Status: SHIPPED | OUTPATIENT
Start: 2025-02-27 | End: 2025-05-28

## 2025-02-27 NOTE — TELEPHONE ENCOUNTER
Last refill:12/02/24  Qty: 180  W/ 0 refills  Last ov: 12/02/24    Requested Prescriptions     Pending Prescriptions Disp Refills    GLIMEPIRIDE 2 MG Oral Tab [Pharmacy Med Name: GLIMEPIRIDE 2 MG TABLET] 180 tablet 0     Sig: TAKE 2 TABLETS (4 MG TOTAL) BY MOUTH DAILY WITH BREAKFAST.     No future appointments.

## 2025-02-28 DIAGNOSIS — F51.01 PRIMARY INSOMNIA: ICD-10-CM

## 2025-02-28 RX ORDER — ZOLPIDEM TARTRATE 10 MG/1
10 TABLET ORAL NIGHTLY PRN
Qty: 90 TABLET | Refills: 0 | Status: SHIPPED | OUTPATIENT
Start: 2025-02-28

## 2025-02-28 NOTE — TELEPHONE ENCOUNTER
LOV:12-2-2024    LAST LAB:11-    LAST RX:  Medication Quantity Refills Start End   ZOLPIDEM 10 MG Oral Tab 90 tablet 0 10/3/2024 --   Sig:   TAKE 1 TABLET BY MOUTH NIGHTLY AS NEEDED     Route:   Oral     Note to Pharmacy:   Not to exceed 4 additional fills before 10/13/2024 DX Code         Next OV: No future appointments.       PROTOCOL  Controlled Substance Medication Duohnl5302/28/2025 12:17 PM    This medication is a controlled substance - forward to provider to refill    Medication is active on med list

## 2025-03-13 RX ORDER — ESCITALOPRAM OXALATE 20 MG/1
20 TABLET ORAL DAILY
Qty: 90 TABLET | Refills: 3 | Status: SHIPPED | OUTPATIENT
Start: 2025-03-13

## 2025-03-13 RX ORDER — POTASSIUM CHLORIDE 750 MG/1
10 TABLET, EXTENDED RELEASE ORAL DAILY
Qty: 90 TABLET | Refills: 3 | Status: SHIPPED | OUTPATIENT
Start: 2025-03-13

## 2025-03-13 NOTE — TELEPHONE ENCOUNTER
Escitalpram   Last refill: 04/29/24  Qty: 90  W/ 3 refills  Last ov: 12/02/24    Potassium  Last refill: 03/12/24  qtY: 90  W/ 3 refills  Last ov 12/02/24  Last labs 11/26/24  Requested Prescriptions     Pending Prescriptions Disp Refills    POTASSIUM CHLORIDE ER 10 MEQ Oral Tab CR [Pharmacy Med Name: POTASSIUM CL ER 10 MEQ TAB WAX] 90 tablet 3     Sig: TAKE 1 TABLET BY MOUTH EVERY DAY    ESCITALOPRAM 20 MG Oral Tab [Pharmacy Med Name: ESCITALOPRAM 20 MG TABLET] 90 tablet 3     Sig: TAKE 1 TABLET BY MOUTH EVERY DAY     No future appointments.

## 2025-04-25 DIAGNOSIS — E78.2 MIXED HYPERLIPIDEMIA: ICD-10-CM

## 2025-04-25 DIAGNOSIS — I10 ESSENTIAL HYPERTENSION: ICD-10-CM

## 2025-04-25 DIAGNOSIS — E11.9 TYPE 2 DIABETES MELLITUS WITHOUT COMPLICATION, WITHOUT LONG-TERM CURRENT USE OF INSULIN (HCC): ICD-10-CM

## 2025-04-25 RX ORDER — AMLODIPINE BESYLATE 5 MG/1
5 TABLET ORAL DAILY
Qty: 90 TABLET | Refills: 0 | Status: SHIPPED | OUTPATIENT
Start: 2025-04-25

## 2025-04-25 NOTE — TELEPHONE ENCOUNTER
Name from pharmacy: AMLODIPINE BESYLATE 5 MG TAB         Will file in chart as: AMLODIPINE 5 MG Oral Tab    Sig: TAKE 1 TABLET (5 MG TOTAL) BY MOUTH DAILY.    Disp: 90 tablet    Refills: 0 (Pharmacy requested: Not specified)    Start: 4/25/2025    Class: Normal    Non-formulary For: Type 2 diabetes mellitus without complication, without long-term current use of insulin (HCC); Essential hypertension; Mixed hyperlipidemia    Last ordered: 4 months ago (12/2/2024) by Nola Villaseñor MD    Last refill: 1/24/2025    Rx #: [id number]    Hypertension Medications Protocol Srsgsq3704/25/2025 12:28 AM   Protocol Details CMP or BMP in past 12 months    Last BP reading less than 140/90    In person appointment or virtual visit in the past 12 mos or appointment in next 3 mos    EGFRCR or GFRNAA > 50    Medication is active on med list      Last refill 12/2/24 #90 0ref  LOV 12/2/24  No future appointments.  BP Readings from Last 3 Encounters:   12/02/24 126/74   05/08/24 124/76   03/14/24 122/80   Last labs 11/26/24    PASSED PROTOCOL, REFILL SENT

## 2025-04-29 DIAGNOSIS — E11.9 TYPE 2 DIABETES MELLITUS WITHOUT COMPLICATION, WITHOUT LONG-TERM CURRENT USE OF INSULIN (HCC): ICD-10-CM

## 2025-04-29 NOTE — TELEPHONE ENCOUNTER
Patient outreach : Advised patient she is do for lab work and office visit , Patient states is leaving for [place] and when she returns she will schedule an appointment         LOV:12-2-2024  LAST LAB:11- Hga1c cmp   LAST RX:   Medication Quantity Refills Start End   METFORMIN HCL 1000 MG Oral Tab 180 tablet 0 2/2/2025 --   Sig:   TAKE 1 TABLET BY MOUTH TWICE A DAY WITH FOOD     Next OV: No future appointments.   PROTOCOL    Diabetes Medication Protocol Dbcnno5004/29/2025 01:42 PM   Protocol Details Last A1C < 7.5 and within past 6 months    In person appointment or virtual visit in the past 6 mos or appointment in next 3 mos    Microalbumin procedure in past 12 months or taking ACE/ARB    EGFRCR or GFRNAA > 50    GFR in the past 12 months    Medication is active on med list

## 2025-05-02 DIAGNOSIS — I10 ESSENTIAL HYPERTENSION: ICD-10-CM

## 2025-05-02 DIAGNOSIS — E11.9 TYPE 2 DIABETES MELLITUS WITHOUT COMPLICATION, WITHOUT LONG-TERM CURRENT USE OF INSULIN (HCC): ICD-10-CM

## 2025-05-02 DIAGNOSIS — E78.2 MIXED HYPERLIPIDEMIA: ICD-10-CM

## 2025-05-02 RX ORDER — CHLORTHALIDONE 25 MG/1
25 TABLET ORAL DAILY
Qty: 90 TABLET | Refills: 0 | Status: SHIPPED | OUTPATIENT
Start: 2025-05-02

## 2025-05-02 NOTE — TELEPHONE ENCOUNTER
Hypertension Medications Protocol Ybjupl4605/02/2025 12:25 AM   Protocol Details CMP or BMP in past 12 months    Last BP reading less than 140/90    In person appointment or virtual visit in the past 12 mos or appointment in next 3 mos    EGFRCR or GFRNAA > 50    Medication is active on med list          Last office visit:  12/02/24  Last refill: 02/02/25  #90, no refills  Last cmp:  11/27/24    BP Readings from Last 3 Encounters:   12/02/24 126/74   05/08/24 124/76   03/14/24 122/80     No future appointments.

## 2025-05-17 DIAGNOSIS — E78.2 MIXED HYPERLIPIDEMIA: ICD-10-CM

## 2025-05-17 DIAGNOSIS — E11.9 TYPE 2 DIABETES MELLITUS WITHOUT COMPLICATION, WITHOUT LONG-TERM CURRENT USE OF INSULIN (HCC): ICD-10-CM

## 2025-05-17 DIAGNOSIS — I10 ESSENTIAL HYPERTENSION: ICD-10-CM

## 2025-05-19 RX ORDER — SEMAGLUTIDE 1.34 MG/ML
1 INJECTION, SOLUTION SUBCUTANEOUS
Qty: 3 ML | Refills: 0 | Status: SHIPPED | OUTPATIENT
Start: 2025-05-19

## 2025-05-19 NOTE — TELEPHONE ENCOUNTER
Requested Prescriptions     Pending Prescriptions Disp Refills    OZEMPIC, 1 MG/DOSE, 4 MG/3ML Subcutaneous Solution Pen-injector [Pharmacy Med Name: OZEMPIC 4 MG/3 ML (1 MG/DOSE)]  1     Sig: INJECT 1MG INTO THE SKIN ONCE A WEEK     Last refill 12/2/24 #9mL x 1   LOV 12/2/24  No future appointments.  Lab Results   Component Value Date     (H) 12/01/2021    A1C 10.7 (H) 11/26/2024       Diabetes Medication Protocol Cnxnco5705/17/2025 07:05 AM   Protocol Details Last A1C < 7.5 and within past 6 months    Medication is active on med list    In person appointment or virtual visit in the past 6 mos or appointment in next 3 mos    Microalbumin procedure in past 12 months or taking ACE/ARB    EGFRCR or GFRNAA > 50    GFR in the past 12 months

## 2025-05-24 RX ORDER — GLIMEPIRIDE 2 MG/1
2 TABLET ORAL
Qty: 90 TABLET | Refills: 3 | Status: SHIPPED | OUTPATIENT
Start: 2025-05-24

## 2025-05-24 NOTE — TELEPHONE ENCOUNTER
Last refill: 02/27/25  Qty 180  W/ 0 refills  Last ov: 12/02/24  Last labs 11/26/24    Requested Prescriptions     Pending Prescriptions Disp Refills    GLIMEPIRIDE 2 MG Oral Tab [Pharmacy Med Name: GLIMEPIRIDE 2 MG TABLET] 90 tablet 3     Sig: TAKE 1 TABLET BY MOUTH DAILY WITH BREAKFAST     No future appointments.

## 2025-05-28 ENCOUNTER — TELEPHONE (OUTPATIENT)
Dept: FAMILY MEDICINE CLINIC | Facility: CLINIC | Age: 60
End: 2025-05-28

## 2025-05-28 DIAGNOSIS — E11.9 TYPE 2 DIABETES MELLITUS WITHOUT COMPLICATION, WITHOUT LONG-TERM CURRENT USE OF INSULIN (HCC): Primary | ICD-10-CM

## 2025-05-28 DIAGNOSIS — I10 ESSENTIAL HYPERTENSION: ICD-10-CM

## 2025-05-28 NOTE — TELEPHONE ENCOUNTER
Patient has appointment with Dr Villaseñor 6/3 for med check, if  wants any labs done for cholesterol or anything else fax orders to [facility] so patient can get those done prior to her appointment with , call patient & let her know

## 2025-05-31 LAB
ABSOLUTE BASOPHILS: 43 CELLS/UL (ref 0–200)
ABSOLUTE EOSINOPHILS: 372 CELLS/UL (ref 15–500)
ABSOLUTE LYMPHOCYTES: 1928 CELLS/UL (ref 850–3900)
ABSOLUTE MONOCYTES: 531 CELLS/UL (ref 200–950)
ABSOLUTE NEUTROPHILS: 3227 CELLS/UL (ref 1500–7800)
ALBUMIN/GLOBULIN RATIO: 2 (CALC) (ref 1–2.5)
ALBUMIN: 4.2 G/DL (ref 3.6–5.1)
ALKALINE PHOSPHATASE: 56 U/L (ref 37–153)
ALT: 8 U/L (ref 6–29)
AST: 12 U/L (ref 10–35)
BASOPHILS: 0.7 %
BILIRUBIN, TOTAL: 0.6 MG/DL (ref 0.2–1.2)
BUN: 13 MG/DL (ref 7–25)
CALCIUM: 9.5 MG/DL (ref 8.6–10.4)
CARBON DIOXIDE: 31 MMOL/L (ref 20–32)
CHLORIDE: 98 MMOL/L (ref 98–110)
CREATININE, RANDOM URINE: 129 MG/DL (ref 20–275)
CREATININE: 0.6 MG/DL (ref 0.5–1.05)
EGFR: 102 ML/MIN/1.73M2
EOSINOPHILS: 6.1 %
GLOBULIN: 2.1 G/DL (CALC) (ref 1.9–3.7)
GLUCOSE: 178 MG/DL (ref 65–99)
HEMATOCRIT: 44.3 % (ref 35–45)
HEMOGLOBIN A1C: 10.8 %
HEMOGLOBIN: 14.3 G/DL (ref 11.7–15.5)
LYMPHOCYTES: 31.6 %
MCH: 27.9 PG (ref 27–33)
MCHC: 32.3 G/DL (ref 32–36)
MCV: 86.4 FL (ref 80–100)
MICROALBUMIN/CREATININE RATIO, RANDOM URINE: 3 MG/G CREAT
MICROALBUMIN: 0.4 MG/DL
MONOCYTES: 8.7 %
MPV: 12.1 FL (ref 7.5–12.5)
NEUTROPHILS: 52.9 %
PLATELET COUNT: 272 THOUSAND/UL (ref 140–400)
POTASSIUM: 3.5 MMOL/L (ref 3.5–5.3)
PROTEIN, TOTAL: 6.3 G/DL (ref 6.1–8.1)
RDW: 12.7 % (ref 11–15)
RED BLOOD CELL COUNT: 5.13 MILLION/UL (ref 3.8–5.1)
SODIUM: 138 MMOL/L (ref 135–146)
WHITE BLOOD CELL COUNT: 6.1 THOUSAND/UL (ref 3.8–10.8)

## 2025-06-03 ENCOUNTER — OFFICE VISIT (OUTPATIENT)
Dept: FAMILY MEDICINE CLINIC | Facility: CLINIC | Age: 60
End: 2025-06-03
Payer: COMMERCIAL

## 2025-06-03 VITALS
WEIGHT: 169.13 LBS | HEART RATE: 76 BPM | TEMPERATURE: 98 F | RESPIRATION RATE: 16 BRPM | BODY MASS INDEX: 28 KG/M2 | DIASTOLIC BLOOD PRESSURE: 72 MMHG | OXYGEN SATURATION: 96 % | SYSTOLIC BLOOD PRESSURE: 120 MMHG

## 2025-06-03 DIAGNOSIS — E78.2 MIXED HYPERLIPIDEMIA: ICD-10-CM

## 2025-06-03 DIAGNOSIS — F51.01 PRIMARY INSOMNIA: ICD-10-CM

## 2025-06-03 DIAGNOSIS — E11.9 TYPE 2 DIABETES MELLITUS WITHOUT COMPLICATION, WITHOUT LONG-TERM CURRENT USE OF INSULIN (HCC): Primary | ICD-10-CM

## 2025-06-03 DIAGNOSIS — H91.93 HEARING PROBLEM OF BOTH EARS: ICD-10-CM

## 2025-06-03 PROCEDURE — 99214 OFFICE O/P EST MOD 30 MIN: CPT | Performed by: INTERNAL MEDICINE

## 2025-06-03 RX ORDER — ATORVASTATIN CALCIUM 40 MG/1
20 TABLET, FILM COATED ORAL NIGHTLY
COMMUNITY
Start: 2025-06-03

## 2025-06-03 RX ORDER — METFORMIN HYDROCHLORIDE 500 MG/1
1000 TABLET, EXTENDED RELEASE ORAL
Qty: 180 TABLET | Refills: 1 | Status: SHIPPED | OUTPATIENT
Start: 2025-06-03 | End: 2025-09-01

## 2025-06-03 RX ORDER — ZOLPIDEM TARTRATE 10 MG/1
10 TABLET ORAL NIGHTLY PRN
Qty: 90 TABLET | Refills: 1 | Status: SHIPPED | OUTPATIENT
Start: 2025-06-03 | End: 2025-11-30

## 2025-06-03 RX ORDER — GLIMEPIRIDE 2 MG/1
6 TABLET ORAL
Qty: 360 TABLET | Refills: 0 | Status: SHIPPED | OUTPATIENT
Start: 2025-06-03 | End: 2025-10-01

## 2025-06-03 NOTE — PROGRESS NOTES
HPI:   May Bowen is a 59 year old female who presents for recheck of her diabetes. Patient’s FBS have been too high. Last visit with ophthalmologist was last year.  Pt has been checking her feet on a regular basis. Pt denies any tingling of the feet. Pt denies any issues with depression. Pt complains of some weight loss. Blood sugar still high. Her daughter noticed some hearing loss. She has too much fatigue, mostly when she exerts any effort. No chest pain, just more tired that she thinks she should be.     Wt Readings from Last 6 Encounters:   06/03/25 169 lb 2 oz (76.7 kg)   12/02/24 177 lb 6 oz (80.5 kg)   05/08/24 178 lb 4 oz (80.9 kg)   03/14/24 180 lb 8 oz (81.9 kg)   08/30/23 178 lb (80.7 kg)   08/14/23 180 lb (81.6 kg)     Body mass index is 28.14 kg/m².     Lab Results   Component Value Date    A1C 10.8 (H) 05/30/2025    A1C 10.7 (H) 11/26/2024    A1C 7.8 (H) 03/28/2024     Lab Results   Component Value Date    CHOLEST 130 03/28/2024    CHOLEST 116 11/30/2022    CHOLEST 144 12/01/2021     Lab Results   Component Value Date    HDL 58 03/28/2024    HDL 45 (L) 11/30/2022    HDL 67 (H) 12/01/2021     Lab Results   Component Value Date    LDL 56 03/28/2024    LDL 53 11/30/2022    LDL 64 12/01/2021     Lab Results   Component Value Date    TRIG 80 03/28/2024    TRIG 94 11/30/2022    TRIG 62 12/01/2021     Lab Results   Component Value Date    AST 12 05/30/2025    AST 15 11/26/2024    AST 15 03/28/2024     Lab Results   Component Value Date    ALT 8 05/30/2025    ALT 11 11/26/2024    ALT 11 03/28/2024     Malb/Cre Calc   Date Value Ref Range Status   04/05/2021 5.8 <=30.0 ug/mg Final     Comment:     <30 ug/mg creatinine       Normal     ug/mg creatinine   Microalbuminuria   >300 ug/mg creatinine      Albuminuria           Current Medications[1]   Past Medical History[2]   Past Surgical History[3]   Social History: Short Social Hx on File[4]  Exercise: minimal.  Diet: doesn't watch     REVIEW OF  SYSTEMS:   GENERAL HEALTH: feels well otherwise  SKIN: denies any unusual skin lesions or rashes  RESPIRATORY: denies shortness of breath with exertion  CARDIOVASCULAR: denies chest pain on exertion  GI: denies abdominal pain and denies heartburn  NEURO: denies headaches    EXAM:   /72   Pulse 76   Temp 97.9 °F (36.6 °C) (Temporal)   Resp 16   Wt 169 lb 2 oz (76.7 kg)   SpO2 96%   BMI 28.14 kg/m²   GENERAL: well developed, well nourished,in no apparent distress  SKIN: no rashes,no suspicious lesions  NECK: supple,no adenopathy,no bruits  LUNGS: clear to auscultation  CARDIO: RRR without murmur  GI: good BS's,no masses, HSM or tenderness  EXTREMITIES: no cyanosis, clubbing or edema  Bilateral barefoot skin diabetic exam is normal, visualized feet and the appearance is normal.  Bilateral monofilament/sensation of both feet is normal.  Pulsation pedal pulse exam of both lower legs/feet is normal as well.  NEURO: sensation is intact to monofilament     ASSESSMENT AND PLAN:   May Bowen is a 59 year old female who presents for a recheck of her diabetes. Diabetic control is not optimal.  Recommendations are: increase ozempic to 2 mg and change to extended metformin 100 mg daily, lipitor down to 20 mg and take 3 glimiperide daily , check HgbA1C, check fasting lipids and CMP in 3 months, lose wgt with carbohydrate controlled diet and exercise, refer to Ophthamology, check feet daily.  The patient indicates understanding of these issues and agrees to the plan.  The patient is asked to return in 3 months.         [1]   Current Outpatient Medications   Medication Sig Dispense Refill    glimepiride 2 MG Oral Tab Take 3 tablets (6 mg total) by mouth daily with breakfast. 360 tablet 0    semaglutide 8 MG/3ML Subcutaneous Solution Pen-injector Inject 2 mg into the skin once a week. 1 each 12    metFORMIN  MG Oral Tablet 24 Hr Take 2 tablets (1,000 mg total) by mouth daily with breakfast. 180 tablet 1     atorvastatin 40 MG Oral Tab Take 0.5 tablets (20 mg total) by mouth nightly.      CHLORTHALIDONE 25 MG Oral Tab TAKE 1 TABLET (25 MG TOTAL) BY MOUTH DAILY. 90 tablet 0    METFORMIN HCL 1000 MG Oral Tab TAKE 1 TABLET BY MOUTH TWICE A DAY WITH FOOD 180 tablet 0    AMLODIPINE 5 MG Oral Tab TAKE 1 TABLET (5 MG TOTAL) BY MOUTH DAILY. 90 tablet 0    POTASSIUM CHLORIDE ER 10 MEQ Oral Tab CR TAKE 1 TABLET BY MOUTH EVERY DAY 90 tablet 3    ESCITALOPRAM 20 MG Oral Tab TAKE 1 TABLET BY MOUTH EVERY DAY 90 tablet 3    ZOLPIDEM 10 MG Oral Tab TAKE 1 TABLET BY MOUTH EVERY DAY AT NIGHT AS NEEDED 90 tablet 0    losartan 50 MG Oral Tab TAKE 1 TABLET BY MOUTH EVERY DAY 90 tablet 1    YUVAFEM 10 MCG Vaginal Tab INSERT 1 TABLET VAGINALLY ONCE DAILY FOR 1 WEEK, THEN 2 TIMES PER WEEK 24 tablet 1   [2]   Past Medical History:   Diabetes (HCC)    Essential hypertension    Hyperlipidemia   [3]   Past Surgical History:  Procedure Laterality Date    Ablation      Appendectomy      Cyst aspiration right      15+ yrs ago    Repair ing hernia,3+y/o,reducibl     [4]   Social History  Socioeconomic History    Marital status:    Tobacco Use    Smoking status: Never    Smokeless tobacco: Never   Vaping Use    Vaping status: Never Used   Substance and Sexual Activity    Alcohol use: Yes     Comment: occasional    Drug use: Never

## 2025-06-10 DIAGNOSIS — E11.9 TYPE 2 DIABETES MELLITUS WITHOUT COMPLICATION, WITHOUT LONG-TERM CURRENT USE OF INSULIN (HCC): Primary | ICD-10-CM

## 2025-06-10 RX ORDER — LOSARTAN POTASSIUM 50 MG/1
50 TABLET ORAL DAILY
Qty: 90 TABLET | Refills: 1 | Status: SHIPPED | OUTPATIENT
Start: 2025-06-10

## 2025-06-10 NOTE — TELEPHONE ENCOUNTER
Name from pharmacy: LOSARTAN POTASSIUM 50 MG TAB         Will file in chart as: LOSARTAN 50 MG Oral Tab    Sig: TAKE 1 TABLET BY MOUTH EVERY DAY    Disp: 90 tablet    Refills: 1    Start: 6/10/2025    Class: Normal    Non-formulary    Last ordered: 5 months ago (12/18/2024) by Nola Villaseñor MD    Last refill: 3/15/2025    Rx #: [id number]    Hypertension Medications Protocol Dnwhoy23/10/2025 12:26 AM   Protocol Details CMP or BMP in past 12 months    Last BP reading less than 140/90    In person appointment or virtual visit in the past 12 mos or appointment in next 3 mos    EGFRCR or GFRNAA > 50    Medication is active on med list      Last refill 12/18/24 #90 1ref  LOV 6/3/25  Last lab CMP 5/30/25  BP Readings from Last 3 Encounters:   06/03/25 120/72   12/02/24 126/74   05/08/24 124/76       PASSED PROTOCOL, REFILL SENT

## 2025-06-19 DIAGNOSIS — E78.2 MIXED HYPERLIPIDEMIA: ICD-10-CM

## 2025-06-19 DIAGNOSIS — E11.9 TYPE 2 DIABETES MELLITUS WITHOUT COMPLICATION, WITHOUT LONG-TERM CURRENT USE OF INSULIN (HCC): ICD-10-CM

## 2025-06-19 DIAGNOSIS — I10 ESSENTIAL HYPERTENSION: ICD-10-CM

## 2025-06-19 RX ORDER — SEMAGLUTIDE 1.34 MG/ML
INJECTION, SOLUTION SUBCUTANEOUS
Qty: 12 EACH | Refills: 3 | Status: SHIPPED | OUTPATIENT
Start: 2025-06-19 | End: 2026-06-14

## 2025-06-19 NOTE — TELEPHONE ENCOUNTER
Last refill: 05/19/25  Qty 3 mL  W/ 0 refills  Last ov: 06/03/25    Requested Prescriptions     Pending Prescriptions Disp Refills    OZEMPIC, 1 MG/DOSE, 4 MG/3ML Subcutaneous Solution Pen-injector [Pharmacy Med Name: OZEMPIC 4 MG/3 ML (1 MG/DOSE)]  0     Sig: INJECT 1 MG INTO THE SKIN ONE TIME PER WEEK     Future Appointments   Date Time Provider Department Center   9/3/2025 10:15 AM Nola Villaseñor MD [facility] [place]

## 2025-06-25 ENCOUNTER — MED REC SCAN ONLY (OUTPATIENT)
Dept: FAMILY MEDICINE CLINIC | Facility: CLINIC | Age: 60
End: 2025-06-25

## 2025-07-02 DIAGNOSIS — E78.2 MIXED HYPERLIPIDEMIA: ICD-10-CM

## 2025-07-02 RX ORDER — ATORVASTATIN CALCIUM 40 MG/1
40 TABLET, FILM COATED ORAL NIGHTLY
Qty: 90 TABLET | Refills: 1 | Status: SHIPPED | OUTPATIENT
Start: 2025-07-02

## 2025-07-02 NOTE — TELEPHONE ENCOUNTER
Last refill: 03/27/25  Qty 90  W/ 0 refills  Last ov:  06/03/25    Requested Prescriptions     Pending Prescriptions Disp Refills    ATORVASTATIN 40 MG Oral Tab [Pharmacy Med Name: ATORVASTATIN 40 MG TABLET] 90 tablet 1     Sig: TAKE 1 TABLET BY MOUTH EVERY DAY AT NIGHT     Future Appointments   Date Time Provider Department Center   9/3/2025 10:15 AM Nola Villaseñor MD [facility] [place]

## 2025-07-26 DIAGNOSIS — E78.2 MIXED HYPERLIPIDEMIA: ICD-10-CM

## 2025-07-26 DIAGNOSIS — E11.9 TYPE 2 DIABETES MELLITUS WITHOUT COMPLICATION, WITHOUT LONG-TERM CURRENT USE OF INSULIN (HCC): ICD-10-CM

## 2025-07-26 DIAGNOSIS — I10 ESSENTIAL HYPERTENSION: ICD-10-CM

## 2025-07-28 RX ORDER — AMLODIPINE BESYLATE 5 MG/1
5 TABLET ORAL DAILY
Qty: 90 TABLET | Refills: 0 | Status: SHIPPED | OUTPATIENT
Start: 2025-07-28

## 2025-07-28 NOTE — TELEPHONE ENCOUNTER
Hypertension Medications Protocol Fbxxoi7207/26/2025 08:30 AM   Protocol Details CMP or BMP in past 12 months    Last BP reading less than 140/90    In person appointment or virtual visit in the past 12 mos or appointment in next 3 mos    EGFRCR or GFRNAA > 50    Medication is active on med list     Last refill - 4/25/25 - #90   Last CMP - 5/31/25 - EGFR - 102  Last b/p- 6/3/25 - 120/72  Last office visit- 6/3/25

## 2025-08-10 DIAGNOSIS — E11.9 TYPE 2 DIABETES MELLITUS WITHOUT COMPLICATION, WITHOUT LONG-TERM CURRENT USE OF INSULIN (HCC): ICD-10-CM

## 2025-08-10 DIAGNOSIS — I10 ESSENTIAL HYPERTENSION: ICD-10-CM

## 2025-08-10 DIAGNOSIS — E78.2 MIXED HYPERLIPIDEMIA: ICD-10-CM

## 2025-08-11 RX ORDER — CHLORTHALIDONE 25 MG/1
25 TABLET ORAL DAILY
Qty: 90 TABLET | Refills: 0 | Status: SHIPPED | OUTPATIENT
Start: 2025-08-11

## (undated) DIAGNOSIS — N95.2 POSTMENOPAUSAL ATROPHIC VAGINITIS: Primary | ICD-10-CM

## (undated) DIAGNOSIS — Z12.11 COLON CANCER SCREENING: Primary | ICD-10-CM

## (undated) NOTE — LETTER
Date: 6/1/2023    Patient Name: Laverne Puente  Reference \"# [id number]          To Whom it may concern/ [employer]:    The above patient was seen at the [facility] for treatment of a medical condition. She has long standing vaginal atrophy and was previously treated with great success with IMVEXXY. She cannot use systemic therapy due to personal history of estrogen receptor positive breast cancer due to increased recurrence rates. Other topical creams were not effective and did not improve dyspareunia. This patient previous success and failure of other estrogen creams should qualify her for coverage of IMVEXXY as treatment for her chronic vaginal atrophy and dyspareunia. Thank you for your time and consideration.   Sincerely,    Babatunde Burciaga MD

## (undated) NOTE — Clinical Note
Hi, Dr. Shireen Bonds! Hope you are doing well! Thank you for referring Ms. Ivonne Medina for rheumatologic evaluation. Please see the discussion portion of my note and let me know if you have any questions.    Emilee Coffey, DO [facility] 8/14/2023